# Patient Record
Sex: FEMALE | Race: WHITE | Employment: STUDENT | URBAN - METROPOLITAN AREA
[De-identification: names, ages, dates, MRNs, and addresses within clinical notes are randomized per-mention and may not be internally consistent; named-entity substitution may affect disease eponyms.]

---

## 2019-07-03 ENCOUNTER — HOSPITAL ENCOUNTER (EMERGENCY)
Facility: CLINIC | Age: 19
Discharge: HOME | End: 2019-07-04
Attending: EMERGENCY MEDICINE | Admitting: EMERGENCY MEDICINE
Payer: COMMERCIAL

## 2019-07-03 VITALS
HEIGHT: 65 IN | WEIGHT: 127 LBS | DIASTOLIC BLOOD PRESSURE: 88 MMHG | SYSTOLIC BLOOD PRESSURE: 121 MMHG | HEART RATE: 58 BPM | BODY MASS INDEX: 21.16 KG/M2 | TEMPERATURE: 98.4 F | RESPIRATION RATE: 20 BRPM | OXYGEN SATURATION: 98 %

## 2019-07-03 DIAGNOSIS — B27.90 INFECTIOUS MONONUCLEOSIS WITHOUT COMPLICATION, INFECTIOUS MONONUCLEOSIS DUE TO UNSPECIFIED ORGANISM: ICD-10-CM

## 2019-07-03 PROCEDURE — 99283 EMERGENCY DEPT VISIT LOW MDM: CPT

## 2019-07-03 ASSESSMENT — MIFFLIN-ST. JEOR: SCORE: 1351.95

## 2019-07-03 NOTE — ED AVS SNAPSHOT
Emergency Department  [address] [place] [address]  Phone:  [phone]  Fax:  [phone]                                    Jina De La Vega   MRN: [id number]    Department:   Emergency Department   Date of Visit:  7/3/2019           After Visit Summary Signature Page    I have received my discharge instructions, and my questions have been answered. I have discussed any challenges I see with this plan with the nurse or doctor.    ..........................................................................................................................................  Patient/Patient Representative Signature      ..........................................................................................................................................  Patient Representative Print Name and Relationship to Patient    ..................................................               ................................................  Date                                   Time    ..........................................................................................................................................  Reviewed by Signature/Title    ...................................................              ..............................................  Date                                               Time          22EPIC Rev 08/18

## 2019-07-04 PROCEDURE — 25000132 ZZH RX MED GY IP 250 OP 250 PS 637: Performed by: EMERGENCY MEDICINE

## 2019-07-04 RX ORDER — OXYCODONE HYDROCHLORIDE 5 MG/1
5 TABLET ORAL ONCE
Status: COMPLETED | OUTPATIENT
Start: 2019-07-04 | End: 2019-07-04

## 2019-07-04 RX ORDER — DIPHENHYDRAMINE HYDROCHLORIDE AND LIDOCAINE HYDROCHLORIDE AND ALUMINUM HYDROXIDE AND MAGNESIUM HYDRO
10 KIT ONCE
Status: COMPLETED | OUTPATIENT
Start: 2019-07-04 | End: 2019-07-04

## 2019-07-04 RX ADMIN — OXYCODONE HYDROCHLORIDE 5 MG: 5 TABLET ORAL at 01:27

## 2019-07-04 RX ADMIN — DIPHENHYDRAMINE HYDROCHLORIDE AND LIDOCAINE HYDROCHLORIDE AND ALUMINUM HYDROXIDE AND MAGNESIUM HYDRO 10 ML: KIT at 01:38

## 2019-07-04 ASSESSMENT — ENCOUNTER SYMPTOMS: SORE THROAT: 1

## 2019-07-04 NOTE — ED PROVIDER NOTES
"  History     Chief Complaint:  Pharyngitis    HPI   Jina De La Vega is a 18 year old female, who is currently ill with mononucleosis, who presents with to the ED for evaluation of pharyngitis. The patient states that she was diagnosed with mono on Monday and was alternating between Tylenol and Ibuprofen for the pain, but they did not help. She states that she finished her course of prednisone with no relief. The patient states she was started on Oxycodone and Dexamethasone earlier today, but her pain has not subsided, which prompted her to present to the ED. She states that she has been trying to drink fluids. The patient states that her last dose of 600 mg of Ibuprofen was at 1900, and she took 1000 mg of Tylenol and one dose of Oxycodone at 2200.    Allergies:  The patient has no known drug allergies.    Medications:    Prednisone  Oxycodone  Dexamethasone     Past Medical History:    Mononucleosis    Past Surgical History:    The patient does not have any pertinent past surgical history.    Family History:    No past pertinent family history.    Social History:  Negative for tobacco use.  Negative for alcohol use.  Negative for drug use.  Marital Status:  Single [1]     Review of Systems   Constitutional: Negative for fever.   HENT: Positive for sore throat.         Jaw pain   Respiratory: Negative for cough and shortness of breath.    Cardiovascular: Negative for chest pain.   Gastrointestinal: Negative for abdominal pain.   All other systems reviewed and are negative.        Physical Exam     Patient Vitals for the past 24 hrs:   BP Temp Temp src Pulse Heart Rate Resp SpO2 Height Weight   07/03/19 2346 121/88 98.4  F (36.9  C) Oral 58 58 20 98 % 1.651 m (5' 5\") 57.6 kg (127 lb)     Physical Exam  General: Appears well-developed and well-nourished.   Head: No signs of trauma.   Mouth/Throat: Oropharynx with erythema and swelling, but uvula midline and airway patent.  Eyes: Conjunctivae are normal.   Neck: " Normal range of motion. No nuchal rigidity. + cervical adenopathy  CV: Normal rate and regular rhythm.    Resp: Effort normal and breath sounds normal. No respiratory distress.   MSK: Normal range of motion. no edema. No Calf tenderness.  Neuro: The patient is alert and oriented.  Speech normal.  GCS 15  Skin: Skin is warm and dry. No rash noted.   Psych: normal mood and affect. behavior is normal.       Emergency Department Course   Interventions:  0127 Roxicodone 5 mg PO  0138 Magic mouthwash 10 mLs swish and swallow    Emergency Department Course:  Nursing notes and vitals reviewed. (0049) I performed an exam of the patient as documented above.      0227 I rechecked the patient and discussed the results of her workup thus far.     Findings and plan explained to the Patient. Patient discharged home with instructions regarding supportive care, medications, and reasons to return. The importance of close follow-up was reviewed.  Impression & Plan    Medical Decision Making:  Jina De La Vega is a 18 year old woman who presents due to sore throat.  She was diagnosed with mono and her doctor had started her on prednisone and oxycodone, but given the continued pain she presented to the ER.  On my evaluation, she did have signs of pharyngitis, but her airway was patent and she was able to speak without difficulty.  She reports that she has been able to eat and drink.  I did discuss ways to optimize her pain control including taking extra dose of the oxycodone.  She is also given Magic mouthwash in the ED with temporary improvement.  Explained that unfortunately I am unable to fully take away the patient's pain.  She is recommended continue with her regimen with adjustments that were discussed and to follow-up with her primary care doctor return to the ER for any further concerns.    Diagnosis:    ICD-10-CM   1. Infectious mononucleosis without complication, infectious mononucleosis due to unspecified organism B27.90        Disposition:  The patient was discharged to home.    Scribe Disclosure:  I, Brandi Coleman, am serving as a scribe on 7/4/2019 at 12:49 AM to personally document services performed by Chauncey Diaz MD based on my observations and the provider's statements to me.     Brandi Coleman  7/3/2019    EMERGENCY DEPARTMENT    Scribe Disclosure:  I, Waldemar Ruy, am serving as a scribe at 1:19 AM on 7/4/2019 to document services personally performed by Chauncey Diaz MD based on my observations and the provider's statements to me.      Chauncey Diaz MD  07/09/19 0419

## 2019-07-04 NOTE — ED TRIAGE NOTES
Pt reports diagnosed with mono on Monday, was alternating between tylenol/ibuprofen and pain was not managed. Pt also finished course of prednisone w/o relief. Pt states she was also started on oxy and dexamethasone today and still having terrible pain. Pt states she is still able to eat/drink.

## 2019-07-05 ENCOUNTER — HOSPITAL ENCOUNTER (INPATIENT)
Facility: CLINIC | Age: 19
LOS: 2 days | Discharge: HOME | DRG: 866 | End: 2019-07-09
Attending: EMERGENCY MEDICINE | Admitting: INTERNAL MEDICINE
Payer: COMMERCIAL

## 2019-07-05 DIAGNOSIS — B27.99 INFECTIOUS MONONUCLEOSIS WITH HEPATITIS: ICD-10-CM

## 2019-07-05 DIAGNOSIS — B27.90 ACUTE TONSILLITIS DUE TO INFECTIOUS MONONUCLEOSIS: Primary | ICD-10-CM

## 2019-07-05 DIAGNOSIS — E86.0 DEHYDRATION: ICD-10-CM

## 2019-07-05 DIAGNOSIS — J03.80 ACUTE TONSILLITIS DUE TO INFECTIOUS MONONUCLEOSIS: Primary | ICD-10-CM

## 2019-07-05 DIAGNOSIS — R11.2 NAUSEA AND VOMITING, INTRACTABILITY OF VOMITING NOT SPECIFIED, UNSPECIFIED VOMITING TYPE: ICD-10-CM

## 2019-07-05 DIAGNOSIS — B27.90 ACUTE PHARYNGITIS DUE TO INFECTIOUS MONONUCLEOSIS: ICD-10-CM

## 2019-07-05 DIAGNOSIS — B17.8 INFECTIOUS MONONUCLEOSIS WITH HEPATITIS: ICD-10-CM

## 2019-07-05 LAB
ALBUMIN SERPL-MCNC: 3.3 G/DL (ref 3.4–5)
ALP SERPL-CCNC: 187 U/L (ref 40–150)
ALT SERPL W P-5'-P-CCNC: 351 U/L (ref 0–50)
ANION GAP SERPL CALCULATED.3IONS-SCNC: 6 MMOL/L (ref 3–14)
AST SERPL W P-5'-P-CCNC: 103 U/L (ref 0–35)
BASOPHILS # BLD AUTO: 0 10E9/L (ref 0–0.2)
BASOPHILS NFR BLD AUTO: 0 %
BILIRUB SERPL-MCNC: 0.7 MG/DL (ref 0.2–1.3)
BUN SERPL-MCNC: 6 MG/DL (ref 7–30)
CALCIUM SERPL-MCNC: 8.6 MG/DL (ref 8.5–10.1)
CHLORIDE SERPL-SCNC: 103 MMOL/L (ref 96–110)
CO2 SERPL-SCNC: 29 MMOL/L (ref 20–32)
CREAT SERPL-MCNC: 0.78 MG/DL (ref 0.5–1)
DIFFERENTIAL METHOD BLD: ABNORMAL
EOSINOPHIL # BLD AUTO: 0 10E9/L (ref 0–0.7)
EOSINOPHIL NFR BLD AUTO: 0 %
ERYTHROCYTE [DISTWIDTH] IN BLOOD BY AUTOMATED COUNT: 15.9 % (ref 10–15)
GFR SERPL CREATININE-BSD FRML MDRD: >90 ML/MIN/{1.73_M2}
GLUCOSE SERPL-MCNC: 109 MG/DL (ref 70–99)
HCT VFR BLD AUTO: 41 % (ref 35–47)
HGB BLD-MCNC: 13.6 G/DL (ref 11.7–15.7)
LACTATE BLD-SCNC: 0.8 MMOL/L (ref 0.7–2)
LYMPHOCYTES # BLD AUTO: 12.7 10E9/L (ref 0.8–5.3)
LYMPHOCYTES NFR BLD AUTO: 53 %
MCH RBC QN AUTO: 29.1 PG (ref 26.5–33)
MCHC RBC AUTO-ENTMCNC: 33.2 G/DL (ref 31.5–36.5)
MCV RBC AUTO: 88 FL (ref 78–100)
MONOCYTES # BLD AUTO: 1 10E9/L (ref 0–1.3)
MONOCYTES NFR BLD AUTO: 4 %
NEUTROPHILS # BLD AUTO: 10.3 10E9/L (ref 1.6–8.3)
NEUTROPHILS NFR BLD AUTO: 43 %
PLATELET # BLD AUTO: 224 10E9/L (ref 150–450)
PLATELET # BLD EST: ABNORMAL 10*3/UL
POTASSIUM SERPL-SCNC: 3.9 MMOL/L (ref 3.4–5.3)
PROT SERPL-MCNC: 7.1 G/DL (ref 6.8–8.8)
RBC # BLD AUTO: 4.67 10E12/L (ref 3.8–5.2)
RBC MORPH BLD: NORMAL
SODIUM SERPL-SCNC: 138 MMOL/L (ref 133–144)
VARIANT LYMPHS BLD QL SMEAR: PRESENT
WBC # BLD AUTO: 24 10E9/L (ref 4–11)

## 2019-07-05 PROCEDURE — G0378 HOSPITAL OBSERVATION PER HR: HCPCS

## 2019-07-05 PROCEDURE — 36415 COLL VENOUS BLD VENIPUNCTURE: CPT | Performed by: INTERNAL MEDICINE

## 2019-07-05 PROCEDURE — 99285 EMERGENCY DEPT VISIT HI MDM: CPT | Mod: 25

## 2019-07-05 PROCEDURE — 25000132 ZZH RX MED GY IP 250 OP 250 PS 637: Performed by: INTERNAL MEDICINE

## 2019-07-05 PROCEDURE — 25000131 ZZH RX MED GY IP 250 OP 636 PS 637: Performed by: INTERNAL MEDICINE

## 2019-07-05 PROCEDURE — 96375 TX/PRO/DX INJ NEW DRUG ADDON: CPT

## 2019-07-05 PROCEDURE — 25800030 ZZH RX IP 258 OP 636: Performed by: INTERNAL MEDICINE

## 2019-07-05 PROCEDURE — 96376 TX/PRO/DX INJ SAME DRUG ADON: CPT

## 2019-07-05 PROCEDURE — 99207 ZZC CDG-HISTORY COMP: MEETS EXP. PROBLEM FOCUSED-DOWN CODED LACK OF ROS: CPT | Performed by: INTERNAL MEDICINE

## 2019-07-05 PROCEDURE — 96374 THER/PROPH/DIAG INJ IV PUSH: CPT

## 2019-07-05 PROCEDURE — 85025 COMPLETE CBC W/AUTO DIFF WBC: CPT | Performed by: EMERGENCY MEDICINE

## 2019-07-05 PROCEDURE — 99207 ZZC NON-BILLABLE SERV PER CHARTING: CPT | Performed by: PHYSICIAN ASSISTANT

## 2019-07-05 PROCEDURE — 83605 ASSAY OF LACTIC ACID: CPT | Performed by: INTERNAL MEDICINE

## 2019-07-05 PROCEDURE — 96361 HYDRATE IV INFUSION ADD-ON: CPT

## 2019-07-05 PROCEDURE — 25000128 H RX IP 250 OP 636: Performed by: EMERGENCY MEDICINE

## 2019-07-05 PROCEDURE — 99218 ZZC INITIAL OBSERVATION CARE,LEVL I: CPT | Performed by: INTERNAL MEDICINE

## 2019-07-05 PROCEDURE — 80053 COMPREHEN METABOLIC PANEL: CPT | Performed by: EMERGENCY MEDICINE

## 2019-07-05 RX ORDER — LIDOCAINE HYDROCHLORIDE 20 MG/ML
5 SOLUTION OROPHARYNGEAL
Status: DISCONTINUED | OUTPATIENT
Start: 2019-07-05 | End: 2019-07-06

## 2019-07-05 RX ORDER — DEXAMETHASONE SODIUM PHOSPHATE 10 MG/ML
6 INJECTION, SOLUTION INTRAMUSCULAR; INTRAVENOUS ONCE
Status: COMPLETED | OUTPATIENT
Start: 2019-07-05 | End: 2019-07-05

## 2019-07-05 RX ORDER — ONDANSETRON 4 MG/1
4 TABLET, ORALLY DISINTEGRATING ORAL EVERY 6 HOURS PRN
COMMUNITY

## 2019-07-05 RX ORDER — ONDANSETRON 2 MG/ML
4 INJECTION INTRAMUSCULAR; INTRAVENOUS EVERY 30 MIN PRN
Status: DISCONTINUED | OUTPATIENT
Start: 2019-07-05 | End: 2019-07-05

## 2019-07-05 RX ORDER — SODIUM CHLORIDE 9 MG/ML
INJECTION, SOLUTION INTRAVENOUS CONTINUOUS
Status: DISCONTINUED | OUTPATIENT
Start: 2019-07-05 | End: 2019-07-05

## 2019-07-05 RX ORDER — SPIRONOLACTONE 25 MG/1
25 TABLET ORAL DAILY
COMMUNITY
End: 2019-07-05

## 2019-07-05 RX ORDER — ACETAMINOPHEN 325 MG/1
650 TABLET ORAL EVERY 4 HOURS PRN
Status: DISCONTINUED | OUTPATIENT
Start: 2019-07-05 | End: 2019-07-09 | Stop reason: HOSPADM

## 2019-07-05 RX ORDER — PREDNISONE 20 MG/1
20 TABLET ORAL 2 TIMES DAILY
Status: ON HOLD | COMMUNITY
End: 2019-07-09

## 2019-07-05 RX ORDER — SPIRONOLACTONE 50 MG/1
50 TABLET, FILM COATED ORAL DAILY
COMMUNITY

## 2019-07-05 RX ORDER — SODIUM CHLORIDE, SODIUM LACTATE, POTASSIUM CHLORIDE, CALCIUM CHLORIDE 600; 310; 30; 20 MG/100ML; MG/100ML; MG/100ML; MG/100ML
INJECTION, SOLUTION INTRAVENOUS CONTINUOUS
Status: DISCONTINUED | OUTPATIENT
Start: 2019-07-05 | End: 2019-07-09

## 2019-07-05 RX ORDER — KETOROLAC TROMETHAMINE 15 MG/ML
10 INJECTION, SOLUTION INTRAMUSCULAR; INTRAVENOUS ONCE
Status: COMPLETED | OUTPATIENT
Start: 2019-07-05 | End: 2019-07-05

## 2019-07-05 RX ORDER — AMOXICILLIN 250 MG
2 CAPSULE ORAL 2 TIMES DAILY PRN
Status: DISCONTINUED | OUTPATIENT
Start: 2019-07-05 | End: 2019-07-09 | Stop reason: HOSPADM

## 2019-07-05 RX ORDER — AMOXICILLIN 250 MG
1 CAPSULE ORAL 2 TIMES DAILY
Status: DISCONTINUED | OUTPATIENT
Start: 2019-07-05 | End: 2019-07-09 | Stop reason: HOSPADM

## 2019-07-05 RX ORDER — ONDANSETRON 2 MG/ML
4 INJECTION INTRAMUSCULAR; INTRAVENOUS EVERY 6 HOURS PRN
Status: DISCONTINUED | OUTPATIENT
Start: 2019-07-05 | End: 2019-07-09 | Stop reason: HOSPADM

## 2019-07-05 RX ORDER — ESCITALOPRAM OXALATE 10 MG/1
10 TABLET ORAL DAILY
COMMUNITY

## 2019-07-05 RX ORDER — ONDANSETRON 4 MG/1
4 TABLET, ORALLY DISINTEGRATING ORAL EVERY 6 HOURS PRN
Status: DISCONTINUED | OUTPATIENT
Start: 2019-07-05 | End: 2019-07-09 | Stop reason: HOSPADM

## 2019-07-05 RX ORDER — PROCHLORPERAZINE 25 MG
25 SUPPOSITORY, RECTAL RECTAL EVERY 12 HOURS PRN
Status: DISCONTINUED | OUTPATIENT
Start: 2019-07-05 | End: 2019-07-09 | Stop reason: HOSPADM

## 2019-07-05 RX ORDER — AMOXICILLIN 250 MG
2 CAPSULE ORAL 2 TIMES DAILY
Status: DISCONTINUED | OUTPATIENT
Start: 2019-07-05 | End: 2019-07-09 | Stop reason: HOSPADM

## 2019-07-05 RX ORDER — AMOXICILLIN 250 MG
1 CAPSULE ORAL 2 TIMES DAILY PRN
Status: DISCONTINUED | OUTPATIENT
Start: 2019-07-05 | End: 2019-07-09 | Stop reason: HOSPADM

## 2019-07-05 RX ORDER — NALOXONE HYDROCHLORIDE 0.4 MG/ML
.1-.4 INJECTION, SOLUTION INTRAMUSCULAR; INTRAVENOUS; SUBCUTANEOUS
Status: DISCONTINUED | OUTPATIENT
Start: 2019-07-05 | End: 2019-07-09 | Stop reason: HOSPADM

## 2019-07-05 RX ORDER — ACETAMINOPHEN 650 MG/1
650 SUPPOSITORY RECTAL EVERY 4 HOURS PRN
Status: DISCONTINUED | OUTPATIENT
Start: 2019-07-05 | End: 2019-07-09 | Stop reason: HOSPADM

## 2019-07-05 RX ORDER — IBUPROFEN 100 MG/5ML
600 SUSPENSION, ORAL (FINAL DOSE FORM) ORAL EVERY 6 HOURS PRN
Status: DISCONTINUED | OUTPATIENT
Start: 2019-07-05 | End: 2019-07-09 | Stop reason: HOSPADM

## 2019-07-05 RX ORDER — OXYCODONE HCL 5 MG/5 ML
5-10 SOLUTION, ORAL ORAL EVERY 4 HOURS PRN
Status: DISCONTINUED | OUTPATIENT
Start: 2019-07-05 | End: 2019-07-06

## 2019-07-05 RX ORDER — PROCHLORPERAZINE MALEATE 10 MG
10 TABLET ORAL EVERY 6 HOURS PRN
Status: DISCONTINUED | OUTPATIENT
Start: 2019-07-05 | End: 2019-07-09 | Stop reason: HOSPADM

## 2019-07-05 RX ORDER — MORPHINE SULFATE 4 MG/ML
4 INJECTION, SOLUTION INTRAMUSCULAR; INTRAVENOUS
Status: COMPLETED | OUTPATIENT
Start: 2019-07-05 | End: 2019-07-05

## 2019-07-05 RX ORDER — DEXAMETHASONE 4 MG/1
12 TABLET ORAL ONCE
Status: ON HOLD | COMMUNITY
End: 2019-07-09

## 2019-07-05 RX ORDER — ESCITALOPRAM OXALATE 20 MG/1
20 TABLET ORAL DAILY
COMMUNITY
End: 2019-07-05

## 2019-07-05 RX ADMIN — ACETAMINOPHEN 650 MG: 325 TABLET, FILM COATED ORAL at 09:33

## 2019-07-05 RX ADMIN — SENNOSIDES AND DOCUSATE SODIUM 1 TABLET: 8.6; 5 TABLET ORAL at 20:12

## 2019-07-05 RX ADMIN — SODIUM CHLORIDE, POTASSIUM CHLORIDE, SODIUM LACTATE AND CALCIUM CHLORIDE: 600; 310; 30; 20 INJECTION, SOLUTION INTRAVENOUS at 17:10

## 2019-07-05 RX ADMIN — OXYCODONE HYDROCHLORIDE 10 MG: 5 SOLUTION ORAL at 09:26

## 2019-07-05 RX ADMIN — DEXAMETHASONE SODIUM PHOSPHATE 6 MG: 10 INJECTION, SOLUTION INTRAMUSCULAR; INTRAVENOUS at 06:56

## 2019-07-05 RX ADMIN — IBUPROFEN 600 MG: 200 SUSPENSION ORAL at 11:56

## 2019-07-05 RX ADMIN — SODIUM CHLORIDE, POTASSIUM CHLORIDE, SODIUM LACTATE AND CALCIUM CHLORIDE: 600; 310; 30; 20 INJECTION, SOLUTION INTRAVENOUS at 08:05

## 2019-07-05 RX ADMIN — MORPHINE SULFATE 4 MG: 4 INJECTION INTRAVENOUS at 05:55

## 2019-07-05 RX ADMIN — IBUPROFEN 600 MG: 200 SUSPENSION ORAL at 17:56

## 2019-07-05 RX ADMIN — IBUPROFEN 600 MG: 200 SUSPENSION ORAL at 23:55

## 2019-07-05 RX ADMIN — OXYCODONE HYDROCHLORIDE 10 MG: 5 SOLUTION ORAL at 22:09

## 2019-07-05 RX ADMIN — ACETAMINOPHEN 650 MG: 325 TABLET, FILM COATED ORAL at 23:56

## 2019-07-05 RX ADMIN — KETOROLAC TROMETHAMINE 10 MG: 15 INJECTION, SOLUTION INTRAMUSCULAR; INTRAVENOUS at 05:26

## 2019-07-05 RX ADMIN — DEXAMETHASONE 6 MG: 2 TABLET ORAL at 17:56

## 2019-07-05 RX ADMIN — SODIUM CHLORIDE 1000 ML: 9 INJECTION, SOLUTION INTRAVENOUS at 05:26

## 2019-07-05 RX ADMIN — ACETAMINOPHEN 650 MG: 325 TABLET, FILM COATED ORAL at 14:03

## 2019-07-05 RX ADMIN — MORPHINE SULFATE 4 MG: 4 INJECTION INTRAVENOUS at 07:02

## 2019-07-05 RX ADMIN — OXYCODONE HYDROCHLORIDE 10 MG: 5 SOLUTION ORAL at 14:03

## 2019-07-05 RX ADMIN — OXYCODONE HYDROCHLORIDE 10 MG: 5 SOLUTION ORAL at 17:55

## 2019-07-05 RX ADMIN — SODIUM CHLORIDE 1000 ML: 9 INJECTION, SOLUTION INTRAVENOUS at 06:21

## 2019-07-05 RX ADMIN — ONDANSETRON 4 MG: 2 INJECTION INTRAMUSCULAR; INTRAVENOUS at 05:26

## 2019-07-05 RX ADMIN — MORPHINE SULFATE 4 MG: 4 INJECTION INTRAVENOUS at 05:26

## 2019-07-05 RX ADMIN — ACETAMINOPHEN 650 MG: 325 TABLET, FILM COATED ORAL at 20:12

## 2019-07-05 ASSESSMENT — ENCOUNTER SYMPTOMS
VOMITING: 1
SORE THROAT: 1
ABDOMINAL PAIN: 0
FEVER: 0

## 2019-07-05 ASSESSMENT — MIFFLIN-ST. JEOR: SCORE: 1351.95

## 2019-07-05 NOTE — ED NOTES
"[facility]  ED Nurse Handoff Report    ED Chief complaint: Nausea & Vomiting      ED Diagnosis:   Final diagnoses:   Infectious mononucleosis with hepatitis   Nausea and vomiting, intractability of vomiting not specified, unspecified vomiting type   Dehydration   Acute pharyngitis due to infectious mononucleosis       Code Status: Full Code    Allergies: No Known Allergies    Activity level - Baseline/Home:  Independent  Activity Level - Current:   Independent    Patient's Preferred language: eng   Needed?: No    Isolation: No  Infection: Not Applicable  Bariatric?: No    Vital Signs:   Vitals:    07/05/19 0550 07/05/19 0600 07/05/19 0610 07/05/19 0620   BP:   112/68 104/67   Pulse:   105    Temp:       TempSrc:       SpO2: 100% 100% 99% 100%   Weight:       Height:           Cardiac Rhythm: ,        Pain level: 0-10 Pain Scale: 9    Is this patient confused?: No   Does this patient have a guardian?  No         If yes, is there guardianship documents in the Epic \"Code/ACP\" activity?  No         Guardian Notified?  N/A  [place] - Suicide Severity Rating Scale Completed?  Yes  If yes, what color did the patient score?  White    Patient Report: Initial Complaint: NV throat pain  Focused Assessment: same  Tests Performed: lab  Abnormal Results:   Labs Ordered and Resulted from Time of ED Arrival Up to the Time of Departure from the ED   CBC WITH PLATELETS DIFFERENTIAL - Abnormal; Notable for the following components:       Result Value    WBC 24.0 (*)     RDW 15.9 (*)     Absolute Neutrophil 10.3 (*)     Absolute Lymphocytes 12.7 (*)     All other components within normal limits   COMPREHENSIVE METABOLIC PANEL - Abnormal; Notable for the following components:    Glucose 109 (*)     Urea Nitrogen 6 (*)     Albumin 3.3 (*)     Alkaline Phosphatase 187 (*)      (*)      (*)     All other components within normal limits   VITAL SIGNS   PULSE OXIMETRY NURSING   PERIPHERAL IV " CATHETER       Treatments provided: below    Family Comments: na    OBS brochure/video discussed/provided to patient/family: Yes              Name of person given brochure if not patient: n              Relationship to patient: n    ED Medications:   Medications   0.9% sodium chloride BOLUS (0 mLs Intravenous Stopped 7/5/19 0621)     Followed by   0.9% sodium chloride BOLUS (1,000 mLs Intravenous New Bag 7/5/19 0621)     Followed by   sodium chloride 0.9% infusion (has no administration in time range)   morphine (PF) injection 4 mg (4 mg Intravenous Given 7/5/19 0555)   ondansetron (ZOFRAN) injection 4 mg (4 mg Intravenous Given 7/5/19 0526)   ketorolac (TORADOL) injection 10 mg (10 mg Intravenous Given 7/5/19 0526)       Drips infusing?:  Yes    For the majority of the shift this patient was Green.   Interventions performed were n.    Severe Sepsis OR Septic Shock Diagnosis Present: No    To be done/followed up on inpatient unit:  na    ED NURSE PHONE NUMBER: [phone]

## 2019-07-05 NOTE — PHARMACY-ADMISSION MEDICATION HISTORY
Admission medication history interview status for the 7/5/2019  admission is complete. See EPIC admission navigator for prior to admission medications     Medication history source reliability:Moderate    Actions taken by pharmacist (provider contacted, etc):interviewed pt twice. Called guevara to confirm list and doses.  Pt is having difficulty speaking due to throat issues.     Additional medication history information not noted on PTA med list :guevara is getting an oxycodone 5-10 mg po q4h prn pain rx ready for her but she has not picked it up yet.     Medication reconciliation/reorder completed by provider prior to medication history? No    Time spent in this activity: 20 minutes    Prior to Admission medications    Medication Sig Last Dose Taking? Auth Provider   dexamethasone (DECADRON) 4 MG tablet Take 12 mg by mouth once 7/3/2019 Yes Unknown, Entered By History   escitalopram (LEXAPRO) 10 MG tablet Take 10 mg by mouth daily Past Week at Unknown time Yes Unknown, Entered By History   ondansetron (ZOFRAN-ODT) 4 MG ODT tab Take 4 mg by mouth every 6 hours as needed for nausea Past Week at Unknown time Yes Unknown, Entered By History   predniSONE (DELTASONE) 20 MG tablet Take 20 mg by mouth 2 times daily For 4 days, picked up 7/1/19 Past Week at Unknown time Yes Unknown, Entered By History   spironolactone (ALDACTONE) 50 MG tablet Take 50 mg by mouth daily Past Week at Unknown time Yes Unknown, Entered By History

## 2019-07-05 NOTE — PLAN OF CARE
RECEIVING UNIT ED HANDOFF REVIEW    ED Nurse Handoff Report was reviewed by: Blanche Jarvis on July 5, 2019 at 6:47 AM

## 2019-07-05 NOTE — PROGRESS NOTES
Pt a&o, up indep in room, mom at bedside.  Pt has iv fluids infusing.  Pt c/o pain, hurts to swallow water or pills.  Pt was able to rest for a few hours after pain medication given.  Pt has also been able to eat a few bites of food.  Pt fever 102, prn tylenol given as well as ice and heat.  Will continue to monitor and POC

## 2019-07-05 NOTE — H&P
Admitted:     07/05/2019      PRIMARY CARE PHYSICIAN:  Oralia Mcintosh MD.      CHIEF COMPLAINT:  Sore throat, nausea, vomiting, recent diagnosis of mono.      HISTORY OF PRESENT ILLNESS:  Jina De La Vega is a pleasant 18-year-old  female who has been sick for the last week.  The patient started having symptoms of sore throat and feeling ill on Friday.  She felt worse on Monday and went into her clinic was diagnosed with mononucleosis.  The patient's symptoms of pain and difficulty swallowing got worse and she came into our emergency department Wednesday.  The patient was treated with some steroids.  She was discharged on oxycodone and the patient felt okay when discharged.  However, over the last 2 days, the patient has had extreme pain in her throat and jaw and difficulty swallowing.  She is unable to keep anything down.  She has no fever or abdominal pain.  She was taking prednisone and not taking dexamethasone for the last 2 days.  The patient has trouble with some nausea and has been vomiting some of her medications, unable to keep anything down.  The patient came into [facility] for further assessment.      In the emergency room, the patient was seen by Dr. Zully Osei.  The patient's temperature of 100 degrees.  She was tachycardic in the low 100s, blood pressures were okay.  Oxygen saturations were okay.  The patient was mentating reasonably well, although she is quite weak.  Blood work revealed normal electrolytes, normal kidney function.  LFTs are elevated with ALT of 351, AST of 103, alkaline phosphatase is 187.  Albumin is 3.3, calcium is 8.6.  White count is 24,000 with a left shift with absolute neutrophil count of 10,300, absolute lymphocytic count of 12,700.  Glucose is 109.  In the Emergency Department, the patient received 6 mg of Decadron.  She was given Toradol as well as a liter of normal saline.  The patient also received some morphine as well for pain control.  She  is being admitted under observation status.      PAST MEDICAL HISTORY:   1.  Ankle sprain.   2.  Right hip pain.    3.  Lumbar spine instability.   4.  Chronic back pain.      PAST SURGICAL HISTORY:  None.      FAMILY HISTORY:  Seasonal allergies.      SOCIAL HISTORY:  The patient lost her father about a year and half ago who was involved with [employer].  No alcohol or recreational drug use, no tobacco.  She is single, lives with her mother.      ALLERGIES:  NO KNOWN DRUG ALLERGIES.      CURRENT MEDICATIONS:  Decadron, oxycodone.      REVIEW OF SYSTEMS:  Ten-point systems reviewed and as dictated in the history of present illness.      PHYSICAL EXAMINATION:   VITAL SIGNS:  Temperature 100, heart rate 105, respirations 20, blood pressure 104/67, sats 100% on room air.   GENERAL:  The patient is a 18-year-old medium-built  female.  She appears to be weak, but is appropriate, alert.   HEENT:  Pupils are equal.  Sclerae are anicteric.  Oropharynx is dry.   NECK:  Veins are not elevated.  She does have some neck fullness and discomfort and adenopathy in the cervical lymph node.  There is no nuchal rigidity.   PULMONARY:  Lungs are clear to auscultation, although the breath sounds at the bases are diminished on the right side.   CARDIOVASCULAR:  S1, S2, tachycardic, regular.   ABDOMEN:  Hypoactive.  She has no tenderness.  There is no guarding or rebound in the spleen or the liver.   MUSCULOSKELETAL:  No edema.   SKIN:  Warm, somewhat warm to touch.  No rash.   NEUROLOGIC:  She is grossly nonfocal with cranial nerves grossly intact.     DERM:  The skin is warm, dry, well perfused.   PSYCHIATRIC:  Mood and affect, stable.      LABORTORY STUDIES:  As dictated in the history of present illness.      ASSESSMENT:  Jina De La Vega is a 18-year-old with a diagnosis of infectious mononucleosis, now for the last 5 days, admitted a couple days ago with ongoing severe pain and anorexia, is readmitted now due to ongoing  recurrent nausea and vomiting.  The patient unable to keep anything down, being admitted under observation status.      PLAN:   1.  Infectious mononucleosis.  The patient was getting prednisone and Decadron at home.  The patient has been dehydrated, unable to keep anything down, has severe pain.  The patient will be admitted under observation status for the next 24 hours.  I am going to give her lactated Ringer's oral liquid and oxycodone for pain control.  We are going to 10 mg instead of 5 and use ibuprofen and Tylenol as well to help mix her medications.  The patient also received Zofran and Compazine to help with her nausea.   2.  Elevation of transaminases.  The patient's transaminases are elevated as a result of her mononucleosis.  We will monitor her LFTs.  We will put her on a modified diet of full liquids to allow ease of eating.   3.  Deep venous thrombosis prophylaxis:  The patient will receive compression boots.      CODE STATUS:  Full.         KITTY FIGUEROA MD             D: 2019   T: 2019   MT: ADITHYA      Name:     JEWELL LANDRY   MRN:      [id number]        Account:      [id number]   :      2000        Admitted:     2019                   Document: [id number]       cc: Oralia Mcintosh MD

## 2019-07-05 NOTE — ED PROVIDER NOTES
History     Chief Complaint:  Sore Throat and Vomitting    HPI   Jina De La Vega is a 18 year old female who presents to the ED for the evaluation of a sore throat and vomiting. The patient has had the above symptoms for a couple of days. On Monday, the patient was diagnosed with Mono. She endorses extreme pain in her throat and jaw, difficulty swallowing, and that she cannot keep anything down. She denies fever or abdominal pain. The patient was taking prednisone but stopped and took a dose of 12 mg of dexamethasone 2 days ago.  Of note, the patient last took a combination of ibuprofen, Tylenol, and OxyContin at 0130 for pain management that she threw up right away.  She hasn't been able to keep anything down recently.  She does note that she was able to keep down her medications previously.  She reports that she was diagnosed with mono by blood test and her pediatrician's office.  She also had a strep test on that was negative.    Allergies:  No Known Drug Allergies    Medications:    Ibuprofen  Tylenol  Oxycontin    Past Medical History:    Ankle sprain  Right hip pain  Lumbar spine instability  Backache    Past Surgical History:    Surgical history reviewed. No pertinent surgical history.    Family History:    Allergies    Social History:  The patient was accompanied to the ED by mother.  She is home from college for the summer.  Her father Zak Ross passed away approximately 16 months ago.  Smoking Status: Never Smoker  Smokeless Tobacco: Never Used  Alcohol Use: Negative  Drug Use: Negative  PCP: Oralia Mcintosh  Marital Status:  Single     Review of Systems   Constitutional: Negative for fever.   HENT: Positive for sore throat.         Difficulty swallowing  Jaw pain   Gastrointestinal: Positive for vomiting. Negative for abdominal pain.   All other systems reviewed and are negative.    Physical Exam   First Vitals:  Patient Vitals for the past 24 hrs:   BP Temp Temp src Heart Rate SpO2  Patient Education     Earache, No Infection (Adult)  Earaches can happen without an infection. This occurs when air and fluid build up behind the eardrum causing a feeling of fullness and discomfort and reduced hearing. This is called otitis media with effusion (OME) or serous otitis media. It means there is fluid in the middle ear. It is not the same as acute otitis media, which is typically from infection. OME can happen when you have a cold if congestion blocks the passage that drains the middle ear. This passage is called the eustachian tube. OME may also occur with nasal allergies or after a bacterial middle ear infection. The pain or discomfort may come and go. You may hear clicking or popping sounds when you chew or swallow. You may feel that your balance is off. Or you may hear ringing in the ear. It often takes from several weeks up to 3 months for the fluid to clear on its own. Oral pain relievers and ear drops help if there is pain. Decongestants and antihistamines sometimes help. Antibiotics don't help since there is no infection. Your doctor may prescribe a nasal spray to help reduce swelling in the nose and eustachian tube. This can allow the ear to drain. If your OME doesn't improve after 3 months, surgery may be used to drain the fluid and insert a small tube in the eardrum to allow continued drainage. Because the middle ear fluid can become infected, it is important to watch for signs of an ear infection which may develop later. These signs include increased ear pain, fever, or drainage from the ear. Home care  The following guidelines will help you care for yourself at home:  Â· You may use over-the-counter medicine as directed to control pain, unless another medicine was prescribed. If you have chronic liver or kidney disease or ever had a stomach ulcer or GI bleeding, talk with your doctor before using these medicines.  Aspirin should never be used in anyone under 25years of age who is "Height Weight   07/05/19 0455 121/69 100  F (37.8  C) Oral 111 99 % 1.651 m (5' 5\") 57.6 kg (127 lb)     Physical Exam  General: Well-nourished, appears to be in severe pain when I enter the room, tearful  Eyes: PERRL, conjunctivae pink no scleral icterus or conjunctival injection  ENT:  Dry mucus membranes, foul-smelling breath, posterior oropharynx erythematous with mild edema no apparent exudates, uvula midline, no stridor or trismus, neck supple.  Doesn't want to talk due to pain but normal voice with phonation, tender anterior cervical lymphadenopathy  Respiratory:  Lungs clear to auscultation bilaterally, no crackles/rubs/wheezes.  Good air movement  CV: Normal rate and rhythm, no murmurs/rubs/gallops  GI:  Abdomen soft and non-distended.  Normoactive BS.  No tenderness, guarding or rebound  Skin: Warm, dry.  No rashes or petechiae  Musculoskeletal: No peripheral edema or calf tenderness  Neuro: Alert and oriented to person/place/time  Psychiatric: Normal affect    Emergency Department Course     Laboratory:  Laboratory findings were communicated with the patient who voiced understanding of the findings.    CBC: WBC 24.0 (H), HGB 13.6,   CMP: glucose 109 (H), BUN 6 (L), albumin 3.3 (L), ALKPHOS 187 (H),  (H),  (H), o/w WNL (Creatinine 0.78)    Interventions:  0526 NS 1000 mL IV  0526 Toradol 10 mg IV  0526 Morphine 4 mg IV  0526 Zofran 4 mg IV    Emergency Department Course:    0442 Nursing notes and vitals reviewed.    0450 I performed an exam of the patient as documented above.     0522 IV was inserted and blood was drawn for laboratory testing, results above.    0617 I spoke with Dr. Fajardo of the hospitalist service from [facility] regarding patient's presentation, findings, and plan of care.    Prior to admission, I personally reviewed the laboratory results with the patient and answered all related questions. Patient admitted to the observation unit under the care of Dr." ill with a fever. It may cause severe liver damage. Â· You may use over-the-counter decongestants such as phenylephrine or pseudoephedrine. But they are not always helpful. Don't use nasal spray decongestants more than 3 days. Longer use can make congestion worse. Prescription nasal sprays from your doctor don't typically have those restrictions. Â· Antihistamines may help if you are also having allergy symptoms. Â· You may use medicines such as guaifenesin to thin mucus and promote drainage. Follow-up care  Follow up with your healthcare provider or as advised if you are not feeling better after 3 days. When to seek medical advice  Call your healthcare provider right away if any of the following occur:  Â· Your ear pain gets worse or does not start to improveÂ   Â· Fever of 100.4Â°F (38Â°C) or higher, or as directed by your healthcare provider  Â· Fluid or blood draining from the ear  Â· Headache or sinus pain  Â· Stiff neck  Â· Unusual drowsiness or confusion  Date Last Reviewed: 10/1/2016  Â© 9782-3898 [employer]. [address] [place] [address]. All rights reserved. This information is not intended as a substitute for professional medical care. Always follow your healthcare professional's instructions. Patient Education     Middle Ear Infection (Adult)  You have an infection of the middle ear, the space behind the eardrum. This is also called acute otitis media (AOM). Sometimes it is caused by the common cold. This is because congestion can block the internal passage (eustachian tube) that drains fluid from the middle ear. When the middle ear fills with fluid, bacteria can grow there and cause an infection. Oral antibiotics are used to treat this illness, not ear drops. Symptoms usually start to improve within 1 to 2 days of treatment.     Home care  The following are general care guidelines:  Â· Finish all of the antibiotic medicine given, even though you may feel better after the first Scotty.    Impression & Plan      Medical Decision Making:  Jina De La Vega is a 18 year old female who presents to the emergency department today for evaluation of sore throat and vomiting in the setting of infectious mononucleosis.  She does not have any abdominal tenderness.  I do not feel she needs abdominal CT imaging.  Laboratory studies reveal a mild elevation of her transaminases which is not uncommon with mononucleosis.  Her airway is intact.  However, she is unable to keep up with her fluid needs and does appear dehydrated.  She is having severe pain and is unable to manage with oral medications due to her swallowing difficulties and her vomiting.  She was treated with IV fluids, parenteral pain medications and antiemetics.  I also did give a dose of Decadron through the IV.  Given the severity of her symptoms and failure of outpatient management we will admit her to the observation unit for ongoing fluid hydration and pain control.  I spoke with Dr. Fajardo who graciously agreed to admit the patient.    Diagnosis:    ICD-10-CM    1. Infectious mononucleosis with hepatitis B27.99 CBC with platelets differential    B17.8    2. Nausea and vomiting, intractability of vomiting not specified, unspecified vomiting type R11.2    3. Dehydration E86.0    4. Acute pharyngitis due to infectious mononucleosis B27.90      Disposition:   The patient is admitted into the care of Dr. Fajardo.    Scribe Disclosure:  Stepan ARMANDO, am serving as a scribe at 4:56 AM on 7/5/2019 to document services personally performed by Zully Osei MD based on my observations and the provider's statements to me.   EMERGENCY DEPARTMENT       Zully Osei MD  07/05/19 0627     few days. Â· You may use over-the-counter medicine, such as acetaminophen or ibuprofen, to control pain and fever, unless something else was prescribed. If you have chronic liver or kidney disease or have ever had a stomach ulcer or gastrointestinal bleeding, talk with your healthcare provider before using these medicines. Do not give aspirin to anyone under 25years of age who has a fever. It may cause severe illness or death. Follow-up care  Follow up with your healthcare provider, or as advised, in 2 weeks if all symptoms have not gotten better, or if hearing doesn't go back to normal within 1 month. When to seek medical advice  Call your healthcare provider right away if any of these occur:  Â· Ear pain gets worse or does not improve after 3 days of treatment  Â· Unusual drowsiness or confusion  Â· Neck pain, stiff neck, or headache  Â· Fluid or blood draining from the ear canal  Â· Fever of 100.4Â°F (38Â°C) or as Sharyle Rook Seizure  Date Last Reviewed: 6/1/2016  Â© 7394-2725 The [address]. [address]. All rights reserved. This information is not intended as a substitute for professional medical care. Always follow your healthcare professional's instructions.

## 2019-07-05 NOTE — PROGRESS NOTES
IM chart review. 18 year old female admitted AM 7/5 after presenting with throat pain and inability tolerate PO in setting mono diagnosis 5 days ago. Febrile with mild tachycardia and leukocytosis 24 on arrival to obs floor triggering lactate draw which was wnl.    Patient not formally seen by this writer as admitted only 5-6 hours ago and remains clinically stable. Continue plan as outlined in H&P by Dr. Fajardo. Viscous lido PRN swish and swallow ordered.    JoAnna Barthell, PA-C  Pager: [phone] until 6pm.  7/5/2019   1:27 PM

## 2019-07-05 NOTE — PLAN OF CARE
PRIMARY DIAGNOSIS: ACUTE PAIN  OUTPATIENT/OBSERVATION GOALS TO BE MET BEFORE DISCHARGE:  1. Pain Status: improved with narcotics, alternative including ice     2. Return to near baseline physical activity: Yes    3. Cleared for discharge by consultants (if involved): N/A    Discharge Planner Nurse   Safe discharge environment identified: Yes  Barriers to discharge: Yes       Entered by: Casandra Adkins 07/05/2019 10:12 AM     Please review provider order for any additional goals.   Nurse to notify provider when observation goals have been met and patient is ready for discharge.

## 2019-07-05 NOTE — PLAN OF CARE
PRIMARY DIAGNOSIS: ACUTE PAIN  OUTPATIENT/OBSERVATION GOALS TO BE MET BEFORE DISCHARGE:  1. Pain Status: improved with narcotics, alternative including ice     2. Return to near baseline physical activity: Yes    3. Cleared for discharge by consultants (if involved): N/A    Discharge Planner Nurse   Safe discharge environment identified: Yes  Barriers to discharge: Yes       Entered by: Casandra Adkins 07/05/2019 11:54 AM     Please review provider order for any additional goals.   Nurse to notify provider when observation goals have been met and patient is ready for discharge.

## 2019-07-06 LAB
ALBUMIN SERPL-MCNC: 2.8 G/DL (ref 3.4–5)
ALP SERPL-CCNC: 159 U/L (ref 40–150)
ALT SERPL W P-5'-P-CCNC: 232 U/L (ref 0–50)
AST SERPL W P-5'-P-CCNC: 50 U/L (ref 0–35)
BILIRUB DIRECT SERPL-MCNC: 0.2 MG/DL (ref 0–0.2)
BILIRUB SERPL-MCNC: 0.5 MG/DL (ref 0.2–1.3)
PROT SERPL-MCNC: 6.6 G/DL (ref 6.8–8.8)
WBC # BLD AUTO: 13.9 10E9/L (ref 4–11)

## 2019-07-06 PROCEDURE — 25000132 ZZH RX MED GY IP 250 OP 250 PS 637: Performed by: PHYSICIAN ASSISTANT

## 2019-07-06 PROCEDURE — 36415 COLL VENOUS BLD VENIPUNCTURE: CPT | Performed by: PHYSICIAN ASSISTANT

## 2019-07-06 PROCEDURE — 85048 AUTOMATED LEUKOCYTE COUNT: CPT | Performed by: PHYSICIAN ASSISTANT

## 2019-07-06 PROCEDURE — 25000125 ZZHC RX 250: Performed by: PHYSICIAN ASSISTANT

## 2019-07-06 PROCEDURE — 25000131 ZZH RX MED GY IP 250 OP 636 PS 637: Performed by: INTERNAL MEDICINE

## 2019-07-06 PROCEDURE — 80076 HEPATIC FUNCTION PANEL: CPT | Performed by: PHYSICIAN ASSISTANT

## 2019-07-06 PROCEDURE — 25800030 ZZH RX IP 258 OP 636: Performed by: INTERNAL MEDICINE

## 2019-07-06 PROCEDURE — 25000132 ZZH RX MED GY IP 250 OP 250 PS 637: Performed by: INTERNAL MEDICINE

## 2019-07-06 PROCEDURE — 25000128 H RX IP 250 OP 636: Performed by: INTERNAL MEDICINE

## 2019-07-06 PROCEDURE — 96376 TX/PRO/DX INJ SAME DRUG ADON: CPT

## 2019-07-06 PROCEDURE — 99225 ZZC SUBSEQUENT OBSERVATION CARE,LEVEL II: CPT | Performed by: PHYSICIAN ASSISTANT

## 2019-07-06 PROCEDURE — G0378 HOSPITAL OBSERVATION PER HR: HCPCS

## 2019-07-06 RX ORDER — LIDOCAINE HYDROCHLORIDE 20 MG/ML
10 SOLUTION OROPHARYNGEAL
Status: DISCONTINUED | OUTPATIENT
Start: 2019-07-06 | End: 2019-07-09 | Stop reason: HOSPADM

## 2019-07-06 RX ORDER — OXYCODONE HCL 5 MG/5 ML
5-10 SOLUTION, ORAL ORAL
Status: DISCONTINUED | OUTPATIENT
Start: 2019-07-06 | End: 2019-07-09 | Stop reason: HOSPADM

## 2019-07-06 RX ORDER — ACETAMINOPHEN 500 MG
1000 TABLET ORAL 3 TIMES DAILY
Status: DISCONTINUED | OUTPATIENT
Start: 2019-07-06 | End: 2019-07-09 | Stop reason: HOSPADM

## 2019-07-06 RX ADMIN — LIDOCAINE HYDROCHLORIDE 5 ML: 20 SOLUTION ORAL; TOPICAL at 15:54

## 2019-07-06 RX ADMIN — ACETAMINOPHEN 650 MG: 325 TABLET, FILM COATED ORAL at 09:42

## 2019-07-06 RX ADMIN — OXYCODONE HYDROCHLORIDE 10 MG: 5 SOLUTION ORAL at 22:09

## 2019-07-06 RX ADMIN — LIDOCAINE HYDROCHLORIDE 5 ML: 20 SOLUTION ORAL; TOPICAL at 18:05

## 2019-07-06 RX ADMIN — SODIUM CHLORIDE, POTASSIUM CHLORIDE, SODIUM LACTATE AND CALCIUM CHLORIDE: 600; 310; 30; 20 INJECTION, SOLUTION INTRAVENOUS at 02:46

## 2019-07-06 RX ADMIN — ONDANSETRON 4 MG: 2 INJECTION INTRAMUSCULAR; INTRAVENOUS at 18:09

## 2019-07-06 RX ADMIN — SENNOSIDES AND DOCUSATE SODIUM 1 TABLET: 8.6; 5 TABLET ORAL at 19:33

## 2019-07-06 RX ADMIN — ACETAMINOPHEN 650 MG: 325 TABLET, FILM COATED ORAL at 04:17

## 2019-07-06 RX ADMIN — ACETAMINOPHEN 1000 MG: 500 TABLET, FILM COATED ORAL at 19:58

## 2019-07-06 RX ADMIN — DEXAMETHASONE 6 MG: 2 TABLET ORAL at 08:01

## 2019-07-06 RX ADMIN — IBUPROFEN 600 MG: 200 SUSPENSION ORAL at 19:33

## 2019-07-06 RX ADMIN — DEXAMETHASONE 6 MG: 2 TABLET ORAL at 19:33

## 2019-07-06 RX ADMIN — Medication 1 ML: at 22:10

## 2019-07-06 RX ADMIN — SODIUM CHLORIDE, POTASSIUM CHLORIDE, SODIUM LACTATE AND CALCIUM CHLORIDE: 600; 310; 30; 20 INJECTION, SOLUTION INTRAVENOUS at 12:43

## 2019-07-06 RX ADMIN — OXYCODONE HYDROCHLORIDE 10 MG: 5 SOLUTION ORAL at 06:19

## 2019-07-06 RX ADMIN — OXYCODONE HYDROCHLORIDE 10 MG: 5 SOLUTION ORAL at 19:05

## 2019-07-06 RX ADMIN — OXYCODONE HYDROCHLORIDE 10 MG: 5 SOLUTION ORAL at 10:30

## 2019-07-06 RX ADMIN — LIDOCAINE HYDROCHLORIDE 10 ML: 20 SOLUTION ORAL; TOPICAL at 22:47

## 2019-07-06 RX ADMIN — SENNOSIDES AND DOCUSATE SODIUM 1 TABLET: 8.6; 5 TABLET ORAL at 08:01

## 2019-07-06 RX ADMIN — IBUPROFEN 600 MG: 200 SUSPENSION ORAL at 13:22

## 2019-07-06 RX ADMIN — OXYCODONE HYDROCHLORIDE 10 MG: 5 SOLUTION ORAL at 14:52

## 2019-07-06 RX ADMIN — SODIUM CHLORIDE, POTASSIUM CHLORIDE, SODIUM LACTATE AND CALCIUM CHLORIDE: 600; 310; 30; 20 INJECTION, SOLUTION INTRAVENOUS at 22:49

## 2019-07-06 RX ADMIN — IBUPROFEN 600 MG: 200 SUSPENSION ORAL at 07:24

## 2019-07-06 RX ADMIN — ACETAMINOPHEN 650 MG: 325 TABLET, FILM COATED ORAL at 15:47

## 2019-07-06 RX ADMIN — OXYCODONE HYDROCHLORIDE 10 MG: 5 SOLUTION ORAL at 01:58

## 2019-07-06 RX ADMIN — LIDOCAINE HYDROCHLORIDE 10 ML: 20 SOLUTION ORAL; TOPICAL at 20:44

## 2019-07-06 NOTE — PROGRESS NOTES
Observation goals PRIOR TO DISCHARGE    Comments:   1. Treatment of dehydration: Met. IVF infusing.     2. Able to tolerate po : Partially met, still c/o throat pain.     3. Home safety: Yes

## 2019-07-06 NOTE — PROGRESS NOTES
Pt a&o, up indep in room, iv fluids infusing.  Pt c/o constant pain in throat with hurting to swallow food, decreased appetite but taking sips of water and ice chips.  Pt mom at bedside.  Pt prn pain meds given when available, pt has been able to take short sleeps and rests between.  Pt voiding well.  Continue to monitor and POC

## 2019-07-06 NOTE — PROGRESS NOTES
Observation goals-  1. Treatment of dehydration - partially met  2. Able to tolerate po - met  3. Home safety - met

## 2019-07-06 NOTE — PLAN OF CARE
PRIMARY DIAGNOSIS: ACUTE PAIN  OUTPATIENT/OBSERVATION GOALS TO BE MET BEFORE DISCHARGE:  1. Pain Status: able to maintain decreased pain with pain meds and alternative comforts including ice     2. Return to near baseline physical activity: Yes    3. Cleared for discharge by consultants (if involved): N/A    Discharge Planner Nurse   Safe discharge environment identified: Yes  Barriers to discharge: Yes       Entered by: Casandra Adkins 07/06/2019 12:21 PM     Please review provider order for any additional goals.   Nurse to notify provider when observation goals have been met and patient is ready for discharge.

## 2019-07-06 NOTE — PROGRESS NOTES
[facility]    Hospitalist Progress Note    Date of Service (when I saw the patient): 07/06/2019    Assessment & Plan   Jina De La Vega is a 18-year-old with a diagnosis of infectious mononucleosis, now for the last 5 days, admitted a couple days ago with ongoing severe pain and anorexia, is readmitted now due to ongoing recurrent nausea and vomiting, and severe pain making it unable for her to maintain adequate oral hydration.     Infectious mononucleosis.  The patient was getting prednisone and Decadron at home.  The patient presented dehydrated, unable to keep anything down, and had severe pain.    --Patient continues to have severe pain with speaking/swallowing despite pain medications and steroids.  --Fever up to 102 yesterday, afebrile today on APAP/Ibu  --WBC 24.0-->13.9  --IV fluids to continue as patient taking very little oral liquid  --Oxycodone for pain control.    --Ibuprofen and Tylenol as well  --Viscous lido PRN swish and swallow ordered.  --Zofran and Compazine to help with her nausea.   --Continue steroids and plan to taper  --Slow improvement so far; if pain worsens or is not beginning to improve by tomorrow, consider ENT consult or further imaging.    Elevation of transaminases.  The patient's transaminases are elevated as a result of her mononucleosis.    --AST and ALT improving    --Modified diet of full liquids to allow ease of eating.     Deep venous thrombosis prophylaxis:  Compression boots.      CODE STATUS:  Full.    Disposition: Patient to remain hospitalized as she still having severe pain, only able to swallow sips, not able to maintain adequate oral hydration and requiring IV fluids.  Possible discharge in 1-2 days if her symptoms improve and if she is able to take adequate p.o.    Monroe Wiggins    Interval History   Patient reports subjective fevers, continued severe throat pain when speaking or swallowing.  Very fatigued.  Denies shortness of breath,  chest pain, abdominal pain.  Nausea and vomiting has resolved.    -Data reviewed today: I reviewed all new labs and imaging results over the last 24 hours.     Physical Exam   Temp: 97  F (36.1  C) Temp src: Oral BP: 113/73 Pulse: 58 Heart Rate: 59 Resp: 16 SpO2: 100 % O2 Device: None (Room air)    Vitals:    07/05/19 0455   Weight: 57.6 kg (127 lb)     Vital Signs with Ranges  Temp:  [95.7  F (35.4  C)-98.3  F (36.8  C)] 97  F (36.1  C)  Pulse:  [58-97] 58  Heart Rate:  [59] 59  Resp:  [16-20] 16  BP: (111-122)/(64-77) 113/73  SpO2:  [98 %-100 %] 100 %  I/O last 3 completed shifts:  In: 1805 [P.O.:600; I.V.:1205]  Out: 3200 [Urine:3200]    Constitutional: Alert, oriented, well-nourished young female lying in bed, ill-appearing.  Not in distress.  HEENT: Oropharynx is dry  Neck: There is some anterior cervical adenopathy equal bilaterally, tender to palpation, supple  Respiratory:  Lungs CTAB.  No crackles, wheezes, or rhonchi, no labored breathing.  Cardiovascular:  Heart RRR, no MRG, no edema.  GI:  Abdomen soft, NT/ND and with normoactive BS  Skin/Integumen:  Warm, mildly diaphoretic.  MSK: CMS x4 grossly intact.    Medications     lactated ringers 100 mL/hr at 07/06/19 0802       dexamethasone  6 mg Oral Q12H HARI     senna-docusate  1 tablet Oral BID    Or     senna-docusate  2 tablet Oral BID       Data   Recent Labs   Lab 07/06/19  0614 07/05/19  0522   WBC 13.9* 24.0*   HGB  --  13.6   MCV  --  88   PLT  --  224   NA  --  138   POTASSIUM  --  3.9   CHLORIDE  --  103   CO2  --  29   BUN  --  6*   CR  --  0.78   ANIONGAP  --  6   JOHN  --  8.6   GLC  --  109*   ALBUMIN 2.8* 3.3*   PROTTOTAL 6.6* 7.1   BILITOTAL 0.5 0.7   ALKPHOS 159* 187*   * 351*   AST 50* 103*       No results found for this or any previous visit (from the past 24 hour(s)).

## 2019-07-06 NOTE — PROGRESS NOTES
A&O, VSS,Ra. C/O constant throat pain. Pain managed with PRN Tylenol, Ibuprofen and Oxycodone. IND. IV infusing. Labs in the morning. Continue to monitor.    Observation goals-  1. Treatment of dehydration - partially met  2. Able to tolerate po - met  3. Home safety - met

## 2019-07-06 NOTE — PLAN OF CARE
Pain fairly controlled with Oxycodone, Tylenol and Ibuprofen. Tolerating full liquids. Denied nausea. Able to take meds crushed or liquid form. Independent in room. Voiding adequately. LR at 100 into PIV.     Observation goals-  1. Treatment of dehydration - partially met  2. Able to tolerate po - met  3. Home safety - met

## 2019-07-06 NOTE — PLAN OF CARE
PRIMARY DIAGNOSIS: ACUTE PAIN  OUTPATIENT/OBSERVATION GOALS TO BE MET BEFORE DISCHARGE:  1. Pain Status: able to maintain decreased pain with pain meds and alternative comforts including ice     2. Return to near baseline physical activity: Yes    3. Cleared for discharge by consultants (if involved): N/A    Discharge Planner Nurse   Safe discharge environment identified: Yes  Barriers to discharge: Yes       Entered by: Casandra Adkins 07/06/2019 8:07 AM     Please review provider order for any additional goals.   Nurse to notify provider when observation goals have been met and patient is ready for discharge.

## 2019-07-07 ENCOUNTER — APPOINTMENT (OUTPATIENT)
Dept: CT IMAGING | Facility: CLINIC | Age: 19
DRG: 866 | End: 2019-07-07
Attending: PHYSICIAN ASSISTANT
Payer: COMMERCIAL

## 2019-07-07 PROBLEM — J03.80 ACUTE TONSILLITIS DUE TO INFECTIOUS MONONUCLEOSIS: Status: ACTIVE | Noted: 2019-07-07

## 2019-07-07 PROBLEM — B27.90 ACUTE TONSILLITIS DUE TO INFECTIOUS MONONUCLEOSIS: Status: ACTIVE | Noted: 2019-07-07

## 2019-07-07 LAB
ALBUMIN SERPL-MCNC: 2.7 G/DL (ref 3.4–5)
ALP SERPL-CCNC: 136 U/L (ref 40–150)
ALT SERPL W P-5'-P-CCNC: 163 U/L (ref 0–50)
ANION GAP SERPL CALCULATED.3IONS-SCNC: 4 MMOL/L (ref 3–14)
AST SERPL W P-5'-P-CCNC: 29 U/L (ref 0–35)
BILIRUB SERPL-MCNC: 0.3 MG/DL (ref 0.2–1.3)
BUN SERPL-MCNC: 7 MG/DL (ref 7–30)
CALCIUM SERPL-MCNC: 8.7 MG/DL (ref 8.5–10.1)
CHLORIDE SERPL-SCNC: 106 MMOL/L (ref 96–110)
CO2 SERPL-SCNC: 32 MMOL/L (ref 20–32)
CREAT SERPL-MCNC: 0.68 MG/DL (ref 0.5–1)
GFR SERPL CREATININE-BSD FRML MDRD: >90 ML/MIN/{1.73_M2}
GLUCOSE SERPL-MCNC: 131 MG/DL (ref 70–99)
POTASSIUM SERPL-SCNC: 4.5 MMOL/L (ref 3.4–5.3)
PROT SERPL-MCNC: 6.5 G/DL (ref 6.8–8.8)
SODIUM SERPL-SCNC: 142 MMOL/L (ref 133–144)
WBC # BLD AUTO: 10.3 10E9/L (ref 4–11)

## 2019-07-07 PROCEDURE — G0378 HOSPITAL OBSERVATION PER HR: HCPCS

## 2019-07-07 PROCEDURE — 25000125 ZZHC RX 250: Performed by: PHYSICIAN ASSISTANT

## 2019-07-07 PROCEDURE — 25800030 ZZH RX IP 258 OP 636: Performed by: INTERNAL MEDICINE

## 2019-07-07 PROCEDURE — 25000132 ZZH RX MED GY IP 250 OP 250 PS 637: Performed by: PHYSICIAN ASSISTANT

## 2019-07-07 PROCEDURE — 96375 TX/PRO/DX INJ NEW DRUG ADDON: CPT

## 2019-07-07 PROCEDURE — 25000131 ZZH RX MED GY IP 250 OP 636 PS 637: Performed by: INTERNAL MEDICINE

## 2019-07-07 PROCEDURE — 12000000 ZZH R&B MED SURG/OB

## 2019-07-07 PROCEDURE — 25000128 H RX IP 250 OP 636: Performed by: INTERNAL MEDICINE

## 2019-07-07 PROCEDURE — 70491 CT SOFT TISSUE NECK W/DYE: CPT

## 2019-07-07 PROCEDURE — 25000131 ZZH RX MED GY IP 250 OP 636 PS 637: Performed by: PHYSICIAN ASSISTANT

## 2019-07-07 PROCEDURE — 96376 TX/PRO/DX INJ SAME DRUG ADON: CPT

## 2019-07-07 PROCEDURE — 85048 AUTOMATED LEUKOCYTE COUNT: CPT | Performed by: PHYSICIAN ASSISTANT

## 2019-07-07 PROCEDURE — 25800030 ZZH RX IP 258 OP 636: Performed by: PHYSICIAN ASSISTANT

## 2019-07-07 PROCEDURE — 36415 COLL VENOUS BLD VENIPUNCTURE: CPT | Performed by: PHYSICIAN ASSISTANT

## 2019-07-07 PROCEDURE — 25000132 ZZH RX MED GY IP 250 OP 250 PS 637: Performed by: INTERNAL MEDICINE

## 2019-07-07 PROCEDURE — 25000125 ZZHC RX 250: Performed by: INTERNAL MEDICINE

## 2019-07-07 PROCEDURE — 99233 SBSQ HOSP IP/OBS HIGH 50: CPT | Performed by: PHYSICIAN ASSISTANT

## 2019-07-07 PROCEDURE — 80053 COMPREHEN METABOLIC PANEL: CPT | Performed by: PHYSICIAN ASSISTANT

## 2019-07-07 RX ORDER — CLINDAMYCIN PHOSPHATE 900 MG/50ML
900 INJECTION, SOLUTION INTRAVENOUS EVERY 8 HOURS
Status: DISCONTINUED | OUTPATIENT
Start: 2019-07-07 | End: 2019-07-09 | Stop reason: HOSPADM

## 2019-07-07 RX ORDER — OXYCODONE HYDROCHLORIDE 5 MG/1
5-10 TABLET ORAL
Status: COMPLETED | OUTPATIENT
Start: 2019-07-07 | End: 2019-07-07

## 2019-07-07 RX ORDER — IOPAMIDOL 755 MG/ML
80 INJECTION, SOLUTION INTRAVASCULAR ONCE
Status: COMPLETED | OUTPATIENT
Start: 2019-07-07 | End: 2019-07-07

## 2019-07-07 RX ADMIN — SENNOSIDES AND DOCUSATE SODIUM 1 TABLET: 8.6; 5 TABLET ORAL at 19:45

## 2019-07-07 RX ADMIN — SODIUM CHLORIDE, POTASSIUM CHLORIDE, SODIUM LACTATE AND CALCIUM CHLORIDE: 600; 310; 30; 20 INJECTION, SOLUTION INTRAVENOUS at 21:14

## 2019-07-07 RX ADMIN — LIDOCAINE HYDROCHLORIDE 10 ML: 20 SOLUTION ORAL; TOPICAL at 18:56

## 2019-07-07 RX ADMIN — ACETAMINOPHEN 1000 MG: 500 TABLET, FILM COATED ORAL at 07:32

## 2019-07-07 RX ADMIN — OXYCODONE HYDROCHLORIDE 10 MG: 5 SOLUTION ORAL at 16:45

## 2019-07-07 RX ADMIN — DEXAMETHASONE 6 MG: 2 TABLET ORAL at 08:35

## 2019-07-07 RX ADMIN — IBUPROFEN 600 MG: 200 SUSPENSION ORAL at 08:35

## 2019-07-07 RX ADMIN — LIDOCAINE HYDROCHLORIDE 10 ML: 20 SOLUTION ORAL; TOPICAL at 08:38

## 2019-07-07 RX ADMIN — IOPAMIDOL 80 ML: 755 INJECTION, SOLUTION INTRAVENOUS at 10:04

## 2019-07-07 RX ADMIN — IBUPROFEN 600 MG: 200 SUSPENSION ORAL at 01:30

## 2019-07-07 RX ADMIN — OXYCODONE HYDROCHLORIDE 5 MG: 5 SOLUTION ORAL at 04:13

## 2019-07-07 RX ADMIN — OXYCODONE HYDROCHLORIDE 10 MG: 5 TABLET ORAL at 19:45

## 2019-07-07 RX ADMIN — LIDOCAINE HYDROCHLORIDE 10 ML: 20 SOLUTION ORAL; TOPICAL at 16:45

## 2019-07-07 RX ADMIN — ONDANSETRON 4 MG: 2 INJECTION INTRAMUSCULAR; INTRAVENOUS at 07:35

## 2019-07-07 RX ADMIN — OXYCODONE HYDROCHLORIDE 10 MG: 5 SOLUTION ORAL at 10:44

## 2019-07-07 RX ADMIN — OXYCODONE HYDROCHLORIDE 10 MG: 5 SOLUTION ORAL at 01:05

## 2019-07-07 RX ADMIN — SODIUM CHLORIDE, POTASSIUM CHLORIDE, SODIUM LACTATE AND CALCIUM CHLORIDE: 600; 310; 30; 20 INJECTION, SOLUTION INTRAVENOUS at 08:25

## 2019-07-07 RX ADMIN — DEXAMETHASONE 6 MG: 2 TABLET ORAL at 19:45

## 2019-07-07 RX ADMIN — LIDOCAINE HYDROCHLORIDE 10 ML: 20 SOLUTION ORAL; TOPICAL at 06:19

## 2019-07-07 RX ADMIN — LIDOCAINE HYDROCHLORIDE 10 ML: 20 SOLUTION ORAL; TOPICAL at 21:10

## 2019-07-07 RX ADMIN — ACETAMINOPHEN 1000 MG: 500 TABLET, FILM COATED ORAL at 13:47

## 2019-07-07 RX ADMIN — IBUPROFEN 600 MG: 200 SUSPENSION ORAL at 21:10

## 2019-07-07 RX ADMIN — LIDOCAINE HYDROCHLORIDE 10 ML: 20 SOLUTION ORAL; TOPICAL at 10:44

## 2019-07-07 RX ADMIN — LIDOCAINE HYDROCHLORIDE 10 ML: 20 SOLUTION ORAL; TOPICAL at 01:06

## 2019-07-07 RX ADMIN — CLINDAMYCIN PHOSPHATE 900 MG: 900 INJECTION, SOLUTION INTRAVENOUS at 11:20

## 2019-07-07 RX ADMIN — CLINDAMYCIN PHOSPHATE 900 MG: 900 INJECTION, SOLUTION INTRAVENOUS at 17:59

## 2019-07-07 RX ADMIN — OXYCODONE HYDROCHLORIDE 10 MG: 5 SOLUTION ORAL at 07:32

## 2019-07-07 RX ADMIN — LIDOCAINE HYDROCHLORIDE 10 ML: 20 SOLUTION ORAL; TOPICAL at 14:41

## 2019-07-07 RX ADMIN — Medication 1 ML: at 01:08

## 2019-07-07 RX ADMIN — SENNOSIDES AND DOCUSATE SODIUM 1 TABLET: 8.6; 5 TABLET ORAL at 07:33

## 2019-07-07 RX ADMIN — OXYCODONE HYDROCHLORIDE 10 MG: 5 SOLUTION ORAL at 13:47

## 2019-07-07 RX ADMIN — LIDOCAINE HYDROCHLORIDE 10 ML: 20 SOLUTION ORAL; TOPICAL at 12:41

## 2019-07-07 RX ADMIN — IBUPROFEN 600 MG: 200 SUSPENSION ORAL at 15:20

## 2019-07-07 RX ADMIN — SODIUM CHLORIDE 60 ML: 9 INJECTION, SOLUTION INTRAVENOUS at 10:04

## 2019-07-07 RX ADMIN — ACETAMINOPHEN 1000 MG: 500 TABLET, FILM COATED ORAL at 19:45

## 2019-07-07 RX ADMIN — LIDOCAINE HYDROCHLORIDE 10 ML: 20 SOLUTION ORAL; TOPICAL at 04:13

## 2019-07-07 RX ADMIN — OXYCODONE HYDROCHLORIDE 10 MG: 5 SOLUTION ORAL at 22:41

## 2019-07-07 RX ADMIN — Medication 1 ML: at 04:12

## 2019-07-07 ASSESSMENT — ACTIVITIES OF DAILY LIVING (ADL)
ADLS_ACUITY_SCORE: 10
ADLS_ACUITY_SCORE: 10

## 2019-07-07 NOTE — PROGRESS NOTES
Observation goals PRIOR TO DISCHARGE    Comments:   1. Treatment of dehydration: Partially met,IVF infusing.      2. Able to tolerate po : Partially met, still c/o throat pain, MD/PA contacted.      3. Home safety: Yes

## 2019-07-07 NOTE — CONSULTS
Consult Date:  07/07/2019      HISTORY OF PRESENT ILLNESS:  This is a patient who has been admitted for a few days for sore throat, found to have mono.  She has received supportive care, but has failed to improve.  She has been on steroids and pain medication but has not improved and may be worsening right-sided sore throat.  She has had no airway complaints.  She does have significant odynophagia and some referred discomfort towards the right ear.  She is otherwise healthy.  No strong history of tonsillitis.  I saw the patient in the observation area of the [facility].  Examination showed the patient sitting upright, alert and oriented.  No distress.  Normal respirations.  Slight muffled quality to voice.  Neck shows no signs of swelling or redness.  There is tenderness bilaterally, more on the right in the tonsillar no region node region, but no fluctuance or mass palpable.  Just tender lymph nodes.   Mouth and oropharynx show good dentition.  Mild trismus.  The left tonsil is 2+ mildly inflamed without exudate.  The right tonsil has significant exudate and surrounding inflammation of the tonsillar pillars slight fullness in the posterior pillar region, but no evidence of peritonsillar abscess.  Uvula midline.  There also appeared to be some drainage from the nasopharynx.      IMPRESSION:  Patient with mono who I think she may have also a right-sided tonsillitis and suspect for extra tonsillar involvement.  I requested a CT scan soft tissue neck which was performed quite quickly.  The radiologist called me and discussed the films with me.  He said there are some enlarged lymph nodes bilaterally and some soft tissue swelling around the right tonsil, but no abscess formation.      RECOMMENDATION:  I think the patient should remain in house to obtain IV clindamycin.  I do not think she is a surgical candidate at this point.  She has not yet been on antibiotics.  She could continue on the steroids  also.  She received Decadron 6 mg this morning.  I think she can start on a soft diet.  If improved either later today or tomorrow discharged on clindamycin.  If she is not much improved, she should stay again tonight with reconsultation by ENT tomorrow, 2019.         ISABELA XAVIER MD             D: 2019   T: 2019   MT: SHEY      Name:     JEWELL LANDRY   MRN:      [id number]        Account:       [id number]   :      2000           Consult Date:  2019      Document: [id number]

## 2019-07-07 NOTE — PLAN OF CARE
Observation goals PRIOR TO DISCHARGE     Comments:     1. Treatment of dehydration- In progress, L/R cont@100     2. Able to tolerate po -Met, denies any nausea at this time    3. Home safety-Not met        A&OX4.VSS on RA . Achy/sharp throat pain managed with PRN oxy, lidocaine swish and swallow, chloraseptic spray, prn ibuprofen with some effect, Pt states that the pain radiates to joy ears worse on the rt ear.Wants all her PRN pain meds to be given as soon as she can have it , wants to be woken up even if she is sleeping. Up ind, ambulating to bathroom and voiding O.K.IVF L.R cont@100.Plan is possible ENT consult.

## 2019-07-07 NOTE — PROGRESS NOTES
Cross Cover:    Paged regarding pain management.    --Increase lidocaine solution to 10 mL PRN every 2 hours.    --Add PRN chloraseptic spray every hour.    --Schedule Tylenol 1000 mg TID.    --Increase PRN oxycodone 5-10 mg Q 3 hours.      Stiven Meléndez PA-C

## 2019-07-07 NOTE — PLAN OF CARE
OBS GOALS    1. Treatment of dehydration: IN PROGRESS, receiving IVF  2. Able to tolerate po: IN PROGRESS, soft diet ordered after CT complete  3. Home safety: MET

## 2019-07-07 NOTE — PROGRESS NOTES
[facility]    Hospitalist Progress Note    Date of Service (when I saw the patient): 07/07/2019    Assessment & Plan   Jina De La Vega is a 18-year-old with a diagnosis of infectious mononucleosis, now for the last 5 days, seen in the ED on 7/3 with ongoing severe pain and anorexia, is readmitted now due to ongoing recurrent nausea and vomiting, and severe pain making it unable for her to maintain adequate oral hydration.     Infectious mononucleosis with associated tonsillitis.  The patient was getting prednisone and Decadron at home.  The patient presented dehydrated, unable to keep anything down, and had severe pain.    --Pt continued to have severe pain with speaking/swallowing despite pain medications and steroids.  --Fever up to 102 initially, now afebrile on APAP/Ibu  --WBC 24.0-->13.9-->10.3  --Oxycodone for pain control.  Increased frequency to every 3 hours as needed last night due to uncontrolled pain.  Patient's mother is concerned about poor pain control and is advocating for pain medications around-the-clock.   --Tylenol scheduled 1000 mg 3 times daily  --Ibuprofen   --Viscous lidocaine PRN swish and swallow ordered.  --Zofran and Compazine prn for nausea  --Continue steroids   --IV fluids to continue as patient taking very little oral liquid  ------ ENT consulted due to severe throat pain and lack of improvement.  Appreciate ENT - Dr. De La Vega recommendations.  --CT soft tissue neck showing enlarged lymph nodes bilaterally and some soft tissue swelling around the right tonsil, but no abscess formation  --IV clindamycin 900 mg every 8 hours started.  ENT recommends patient stay in hospital for IV antibiotics and can start soft diet.  --Possible discharge tomorrow on oral clindamycin, steroid taper, and pain medication patient improves and is maintaining adequate hydration.    Elevation of transaminases.  The patient's transaminases are elevated as a result of her mononucleosis.     --AST and ALT improving       Deep venous thrombosis prophylaxis:  Compression boots.      CODE STATUS:  Full.    Disposition: Patient to remain hospitalized as she still having significant pain, taking only sips of water, not able to maintain adequate oral hydration and requiring IV fluids and IV antibiotics.  Possible discharge in 1-2 days if her symptoms improve and if she is able to take adequate p.o.    *Discussed the above plans with patient's mother at the bedside, as well as Dr. De La Vega of ENT.    Monroe Wiggins    Interval History   Patient reports severe throat pain last night, 10/10.  Patient states that pain was not well controlled and her oxycodone was increased.  Mother is concerned about the patient's pain control, elects to stay by the bedside to request pain medications on behalf of the patient as soon as they are available.  Discussed rationale for not scheduling opioid pain medications for this issue.  Did discuss other pain control measures as well.  Patient remains very fatigued.  She does state that overall she is improving.  Denies shortness of breath, chest pain, abdominal pain.  Nausea and vomiting has resolved.     -Data reviewed today: I reviewed all new labs and imaging results over the last 24 hours.     Physical Exam   Temp: 96.4  F (35.8  C) Temp src: Oral BP: 130/83   Heart Rate: 70 Resp: 16 SpO2: 99 % O2 Device: None (Room air)    Vitals:    07/05/19 0455   Weight: 57.6 kg (127 lb)     Vital Signs with Ranges  Temp:  [95.7  F (35.4  C)-98.1  F (36.7  C)] 96.4  F (35.8  C)  Heart Rate:  [60-73] 70  Resp:  [15-18] 16  BP: (117-130)/(68-83) 130/83  SpO2:  [95 %-100 %] 99 %  I/O last 3 completed shifts:  In: 240 [P.O.:240]  Out: -     Constitutional: Alert, oriented, well-nourished young female lying in bed, ill-appearing.  Not in distress.  HEENT: Oropharynx is dry.  Left tonsil mildly inflamed without exudate, right tonsil with exudate and surrounding inflammation/swelling.   Uvula midline.  Neck: There is some anterior cervical adenopathy equal bilaterally, tender to palpation, supple.  No erythema.  Respiratory:  Lungs CTAB.  No crackles, wheezes, or rhonchi, no labored breathing.  Cardiovascular:  Heart RRR, no MRG, no edema.  GI:  Abdomen soft, NT/ND and with normoactive BS  Skin/Integumen:  Warm, mildly diaphoretic.  MSK: CMS x4 grossly intact.    Medications     lactated ringers 100 mL/hr at 07/07/19 0825       acetaminophen  1,000 mg Oral TID     clindamycin  900 mg Intravenous Q8H     dexamethasone  6 mg Oral Q12H HARI     senna-docusate  1 tablet Oral BID    Or     senna-docusate  2 tablet Oral BID       Data   Recent Labs   Lab 07/07/19  0655 07/06/19  0614 07/05/19  0522   WBC 10.3 13.9* 24.0*   HGB  --   --  13.6   MCV  --   --  88   PLT  --   --  224     --  138   POTASSIUM 4.5  --  3.9   CHLORIDE 106  --  103   CO2 32  --  29   BUN 7  --  6*   CR 0.68  --  0.78   ANIONGAP 4  --  6   JOHN 8.7  --  8.6   *  --  109*   ALBUMIN 2.7* 2.8* 3.3*   PROTTOTAL 6.5* 6.6* 7.1   BILITOTAL 0.3 0.5 0.7   ALKPHOS 136 159* 187*   * 232* 351*   AST 29 50* 103*       Recent Results (from the past 24 hour(s))   CT Soft Tissue Neck w Contrast    Narrative    CT SCAN OF THE NECK WITH CONTRAST  7/7/2019  10:10 AM     HISTORY: Mononucleosis, concern for tonsillar abscess.    TECHNIQUE: Axial images and coronal reformations. Radiation dose for  this scan was reduced using automated exposure control, adjustment of  the mA and/or kV according to patient size, or iterative  reconstruction technique. 80 mL Isovue-370 IV.     COMPARISON: None.    FINDINGS:   Visualized sinuses, nasopharynx and orbits: Normal.     Tongue, oral cavity and oropharynx: Normal. The palatine tonsils are  not enlarged. There is a small amount of soft tissue edema in the  region of the right palatine tonsil. No discrete abscess is seen.    Hypopharynx: Normal.     Larynx and trachea: Normal.     Thyroid:  Normal.    Submandibular glands: Normal.     Parotid glands: Normal.      Lymph nodes: Multiple enlarged lymph nodes are seen in both the right  and left neck. There is a right-sided level 2A lymph node measuring  1.8 cm in axial dimension. A left-sided level 2A lymph node is also  seen measuring 1.7 cm in axial dimension.     Vasculature: Normal.     Upper mediastinum and lungs: Normal.     Bones: Normal.      Impression    IMPRESSION:   1. Multiple enlarged cervical lymph nodes consistent with the  patient's history of mononucleosis.  2. Mild edema is seen in the region of the right palatine tonsil but  no discrete abscess is identified.    MINDA CHRISTIANSEN MD

## 2019-07-07 NOTE — PLAN OF CARE
Pt to transfer inpatient when bed available. A&Ox4. VSS on RA, afebrile. Mononucleosis, no neck swelling. Achy/sharp throat pain controlled w/ oxy (suspension PO), ibuprofen, tylenol, lidocaine, and heat packs; pt wishes to receive PRN pain meds as soon as available. Zofran x1 for nausea. Mau bites of soft diet and sips of liquids. IVF infusing. IV antibiotic q 8 hrs. WBC: 10.3. ALT: 163. AST: 29. Neck CT complete and resulted. Up ind. Voiding adequately w/o difficulty, last bowel movement 7/5/19. ENT following. Mother at bedside. Continue to monitor.

## 2019-07-07 NOTE — PLAN OF CARE
/OX4. VSS on RA. Easily fatigued. Throat pain managed w/ ra pain  meds and PRN's. Pt states that the pain radiates to R ear, PA paged regarding uncontrolled pain, refer to PA note. Zofran given for nausea w/ relief.Mau full liquid diet well, ate 100% of dinner. IVF infusing as ordered. Pt's mother is at the bedside.

## 2019-07-07 NOTE — PROGRESS NOTES
Report given to ashanti RN. Pt en route to 8th floor. 1800 clindamycin and phenol spray sent w/ pt chart

## 2019-07-07 NOTE — PLAN OF CARE
Observation goals PRIOR TO DISCHARGE     Comments:     1. Treatment of dehydration- In progress, L/R cont@100     2. Able to tolerate po -Met, denies any nausea at this time    3. Home safety-Not met

## 2019-07-07 NOTE — PROVIDER NOTIFICATION
MD Notification    Notified Person: MD    Notified Person Name: Yolette     Notification Date/Time: 1040     Notification Interaction: telephone     Purpose of Notification: Writer was paging about another pt but coincidentally Yolette had just gotten off the phone w/ CT regarding Alanis's results    Orders Received: CT neg for abscess, no surgical intervention at this time. Proceed to soft diet as tolerated. Continue IV clindamycin as ordered    Comments: Yolette would prefer to keep pt overnight as he is concerned about her R tonsil and wants to make sure the antibiotics are helping and pain is controlled

## 2019-07-07 NOTE — PLAN OF CARE
A&Ox4. VSS. Pain managed with oxycodone, ibuprofen, lido swish & swallow, and scheduled tylenol. Tolerating more PO intake. Up independently. Voiding.

## 2019-07-07 NOTE — DISCHARGE INSTRUCTIONS
[facility] [place] [facility] [place]  at [facility]  [address]   ?[phone] [place] [address]   Appointments: [phone] or toll-free [phone]

## 2019-07-08 LAB
ALBUMIN SERPL-MCNC: 2.7 G/DL (ref 3.4–5)
ALP SERPL-CCNC: 125 U/L (ref 40–150)
ALT SERPL W P-5'-P-CCNC: 133 U/L (ref 0–50)
AST SERPL W P-5'-P-CCNC: 34 U/L (ref 0–35)
BILIRUB DIRECT SERPL-MCNC: 0.1 MG/DL (ref 0–0.2)
BILIRUB SERPL-MCNC: 0.3 MG/DL (ref 0.2–1.3)
PROT SERPL-MCNC: 6.4 G/DL (ref 6.8–8.8)
WBC # BLD AUTO: 7.6 10E9/L (ref 4–11)

## 2019-07-08 PROCEDURE — 85048 AUTOMATED LEUKOCYTE COUNT: CPT | Performed by: PHYSICIAN ASSISTANT

## 2019-07-08 PROCEDURE — 25800030 ZZH RX IP 258 OP 636: Performed by: PHYSICIAN ASSISTANT

## 2019-07-08 PROCEDURE — 36415 COLL VENOUS BLD VENIPUNCTURE: CPT | Performed by: PHYSICIAN ASSISTANT

## 2019-07-08 PROCEDURE — 25000131 ZZH RX MED GY IP 250 OP 636 PS 637: Performed by: PHYSICIAN ASSISTANT

## 2019-07-08 PROCEDURE — 80076 HEPATIC FUNCTION PANEL: CPT | Performed by: PHYSICIAN ASSISTANT

## 2019-07-08 PROCEDURE — 25000132 ZZH RX MED GY IP 250 OP 250 PS 637: Performed by: PHYSICIAN ASSISTANT

## 2019-07-08 PROCEDURE — 25000125 ZZHC RX 250: Performed by: PHYSICIAN ASSISTANT

## 2019-07-08 PROCEDURE — 12000000 ZZH R&B MED SURG/OB

## 2019-07-08 PROCEDURE — 99232 SBSQ HOSP IP/OBS MODERATE 35: CPT | Performed by: STUDENT IN AN ORGANIZED HEALTH CARE EDUCATION/TRAINING PROGRAM

## 2019-07-08 RX ADMIN — LIDOCAINE HYDROCHLORIDE 10 ML: 20 SOLUTION ORAL; TOPICAL at 08:32

## 2019-07-08 RX ADMIN — IBUPROFEN 600 MG: 200 SUSPENSION ORAL at 10:29

## 2019-07-08 RX ADMIN — LIDOCAINE HYDROCHLORIDE 10 ML: 20 SOLUTION ORAL; TOPICAL at 10:26

## 2019-07-08 RX ADMIN — ACETAMINOPHEN 650 MG: 325 TABLET, FILM COATED ORAL at 04:11

## 2019-07-08 RX ADMIN — LIDOCAINE HYDROCHLORIDE 10 ML: 20 SOLUTION ORAL; TOPICAL at 01:46

## 2019-07-08 RX ADMIN — ACETAMINOPHEN 1000 MG: 500 TABLET, FILM COATED ORAL at 14:31

## 2019-07-08 RX ADMIN — LIDOCAINE HYDROCHLORIDE 10 ML: 20 SOLUTION ORAL; TOPICAL at 14:31

## 2019-07-08 RX ADMIN — OXYCODONE HYDROCHLORIDE 10 MG: 5 SOLUTION ORAL at 14:32

## 2019-07-08 RX ADMIN — ACETAMINOPHEN 1000 MG: 500 TABLET, FILM COATED ORAL at 08:31

## 2019-07-08 RX ADMIN — LIDOCAINE HYDROCHLORIDE 10 ML: 20 SOLUTION ORAL; TOPICAL at 00:05

## 2019-07-08 RX ADMIN — OXYCODONE HYDROCHLORIDE 10 MG: 5 SOLUTION ORAL at 08:32

## 2019-07-08 RX ADMIN — OXYCODONE HYDROCHLORIDE 10 MG: 5 SOLUTION ORAL at 01:46

## 2019-07-08 RX ADMIN — CLINDAMYCIN PHOSPHATE 900 MG: 900 INJECTION, SOLUTION INTRAVENOUS at 10:25

## 2019-07-08 RX ADMIN — IBUPROFEN 600 MG: 200 SUSPENSION ORAL at 22:24

## 2019-07-08 RX ADMIN — SODIUM CHLORIDE, POTASSIUM CHLORIDE, SODIUM LACTATE AND CALCIUM CHLORIDE: 600; 310; 30; 20 INJECTION, SOLUTION INTRAVENOUS at 08:31

## 2019-07-08 RX ADMIN — OXYCODONE HYDROCHLORIDE 10 MG: 5 SOLUTION ORAL at 20:49

## 2019-07-08 RX ADMIN — ACETAMINOPHEN 1000 MG: 500 TABLET, FILM COATED ORAL at 20:42

## 2019-07-08 RX ADMIN — CLINDAMYCIN PHOSPHATE 900 MG: 900 INJECTION, SOLUTION INTRAVENOUS at 01:46

## 2019-07-08 RX ADMIN — OXYCODONE HYDROCHLORIDE 10 MG: 5 SOLUTION ORAL at 11:32

## 2019-07-08 RX ADMIN — LIDOCAINE HYDROCHLORIDE 10 ML: 20 SOLUTION ORAL; TOPICAL at 04:05

## 2019-07-08 RX ADMIN — ONDANSETRON 4 MG: 4 TABLET, ORALLY DISINTEGRATING ORAL at 17:36

## 2019-07-08 RX ADMIN — LIDOCAINE HYDROCHLORIDE 10 ML: 20 SOLUTION ORAL; TOPICAL at 12:49

## 2019-07-08 RX ADMIN — IBUPROFEN 600 MG: 200 SUSPENSION ORAL at 16:12

## 2019-07-08 RX ADMIN — CLINDAMYCIN PHOSPHATE 900 MG: 900 INJECTION, SOLUTION INTRAVENOUS at 17:59

## 2019-07-08 RX ADMIN — LIDOCAINE HYDROCHLORIDE 10 ML: 20 SOLUTION ORAL; TOPICAL at 20:43

## 2019-07-08 RX ADMIN — SENNOSIDES AND DOCUSATE SODIUM 2 TABLET: 8.6; 5 TABLET ORAL at 10:25

## 2019-07-08 RX ADMIN — OXYCODONE HYDROCHLORIDE 10 MG: 5 SOLUTION ORAL at 05:29

## 2019-07-08 RX ADMIN — IBUPROFEN 600 MG: 200 SUSPENSION ORAL at 04:05

## 2019-07-08 RX ADMIN — DEXAMETHASONE 6 MG: 2 TABLET ORAL at 20:43

## 2019-07-08 RX ADMIN — OXYCODONE HYDROCHLORIDE 10 MG: 5 SOLUTION ORAL at 17:29

## 2019-07-08 RX ADMIN — DEXAMETHASONE 6 MG: 2 TABLET ORAL at 08:31

## 2019-07-08 RX ADMIN — LIDOCAINE HYDROCHLORIDE 10 ML: 20 SOLUTION ORAL; TOPICAL at 06:28

## 2019-07-08 RX ADMIN — ONDANSETRON 4 MG: 4 TABLET, ORALLY DISINTEGRATING ORAL at 08:38

## 2019-07-08 RX ADMIN — SENNOSIDES AND DOCUSATE SODIUM 2 TABLET: 8.6; 5 TABLET ORAL at 20:43

## 2019-07-08 ASSESSMENT — ACTIVITIES OF DAILY LIVING (ADL)
ADLS_ACUITY_SCORE: 10

## 2019-07-08 NOTE — PROGRESS NOTES
"CLINICAL NUTRITION SERVICES  -  ASSESSMENT NOTE      Recommendations Ordered by Registered Dietitian (RD):   Ordered Boost (reta) with meals TID  Ordered Magic Cups (reta) at 2 PM   Malnutrition:   % Weight Loss:  None noted  % Intake:  <75% for > 7 days (non-severe malnutrition)  Subcutaneous Fat Loss:  None observed  Muscle Loss:  None observed  Fluid Retention:  None noted    Malnutrition Diagnosis: Patient does not meet two of the above criteria necessary for diagnosing malnutrition     REASON FOR ASSESSMENT  Jina De La Vega is a 18 year old female seen by Registered Dietitian for Admission Nutrition Risk Screen for reduced oral intake over the last month      NUTRITION HISTORY  - Information obtained from the pt's mother (pt was asleep)  - Patient is on a regular diet at home with no allergies  - Typical food/fluid intake is 3 meals a day (likes meats and veggies)  - Poor intake x 1 week due to mouth pain with swallowing   - Pt had 2 meals yest and ate %   - Pt is a triathlete (frequent exercise)    CURRENT NUTRITION ORDERS  Diet Order:     Mechanical/Dental Soft     Current Intake/Tolerance:  -Poor intake due to swallowing difficulties - pt's mother said she got bread stuck in her throat yesterday and could not swallow  -Some nausea with food, appetite fair  -Ordered cream of potato soup and a blueberry muffin for breakfast this AM but only had bites of the soup    NUTRITION FOCUSED PHYSICAL ASSESSMENT (NFPA)  Completed:  Yes Visual assessment only         Observed:    No nutrition-related physical findings observed    Obtained from Chart/Interdisciplinary Team:  None    ANTHROPOMETRICS  Height: 5' 5\"  Weight: 57.6 kg   Body mass index is 21.13 kg/m .  Weight Status:  Normal BMI  IBW: 56.8 kg   % IBW: 107%  Weight History:   No recent weight hx.   Mother stated UBW is 122-125#.     LABS  Labs reviewed    MEDICATIONS  Medications reviewed  IVF @ 100 ml/hr     ASSESSED NUTRITION NEEDS PER APPROVED " PRACTICE GUIDELINES:    Dosing Weight 57.6 kg (admit wt)  Estimated Energy Needs: 3924-7405 kcals (25-30 Kcal/Kg)  Justification: maintenance  Estimated Protein Needs: 58-70 grams protein (1-1.2 g pro/Kg)  Justification: hypercatabolism with acute illness  Estimated Fluid Needs: 5581-7187 mL (1 mL/Kcal)  Justification: maintenance    MALNUTRITION:  % Weight Loss:  None noted  % Intake:  <75% for > 7 days (non-severe malnutrition)  Subcutaneous Fat Loss:  None observed  Muscle Loss:  None observed  Fluid Retention:  None noted    Malnutrition Diagnosis: Patient does not meet two of the above criteria necessary for diagnosing malnutrition    NUTRITION DIAGNOSIS:  Inadequate oral intake related to swallowing dysfunction and nausea with food as evidenced by <75% intake for the past week.       NUTRITION INTERVENTIONS  Recommendations / Nutrition Prescription  Ordered Boost (reta) with meals TID  Ordered Magic Cups (reta) at 2 PM     Implementation  Nutrition education: Provided education on nutritional supplements  Medical Food Supplement    Nutrition Goals  Pt will consume % of meals and supplements TID.     MONITORING AND EVALUATION:  Progress towards goals will be monitored and evaluated per protocol and Practice Guidelines    Thais Williams  Nutrition Services

## 2019-07-08 NOTE — PLAN OF CARE
C/o throat pain 8/10. Using PRN's often with minimal relief. Tolerating small PO intake, continue IVF. Up in room ind. Plan for discharge tomorrow.

## 2019-07-08 NOTE — PROGRESS NOTES
Right-side throat pain improving. Airway intact. Gave some PRNs per pt's request. Tolerating diet fairly well. Hoping to discharge home in the morning.

## 2019-07-08 NOTE — PLAN OF CARE
A&Ox4. VSS on RA. C/o throat pain, oxycodone, ibuprofen, lido swish, and tylenol with some relief. PIV infusing LR at 100 ml/hr. Up independently in the room. Mother at bedside. Will continue to monitor.

## 2019-07-08 NOTE — PROGRESS NOTES
[facility]    Hospitalist Progress Note    Date of Service (when I saw the patient): 07/08/2019    Assessment & Plan   Jina De La Vega is a 18-year-old with a diagnosis of infectious mononucleosis, now for the last 5 days, seen in the ED on 7/3 with ongoing severe pain and anorexia, is readmitted now due to ongoing recurrent nausea and vomiting, and severe pain making it unable for her to maintain adequate oral hydration.     Infectious mononucleosis with associated tonsillitis.  The patient was getting prednisone and Decadron at home.  The patient presented dehydrated, unable to keep anything down, and had severe pain.    --Pt continued to have severe pain with speaking/swallowing despite pain medications and steroids.  --Fever up to 102 initially, now afebrile on APAP/Ibu  --WBC 24.0-->13.9-->10.3  --Oxycodone for pain control.  Increased frequency to every 3 hours as needed last night due to uncontrolled pain.  Patient's mother is concerned about poor pain control and is advocating for pain medications around-the-clock.   --Tylenol scheduled 1000 mg 3 times daily  --Ibuprofen   --Viscous lidocaine PRN swish and swallow ordered.  --Zofran and Compazine prn for nausea  --Continue steroids   --IV fluids to continue as patient taking very little oral liquid  ------ ENT consulted due to severe throat pain and lack of improvement.  Appreciate ENT - Dr. De La Vega recommendations.  --CT soft tissue neck showing enlarged lymph nodes bilaterally and some soft tissue swelling around the right tonsil, but no abscess formation  Plan:  --IV clindamycin 900 mg every 8 hours started.  ENT recommends patient stay in hospital for IV antibiotics and can start soft diet.  --Possible discharge tomorrow on oral clindamycin, steroid taper, and pain medication patient improves and is able to maintain adequate hydration.    Elevation of transaminases.  The patient's transaminases are elevated as a result of her  mononucleosis.    --AST and ALT improving       Deep venous thrombosis prophylaxis:  Compression boots.      CODE STATUS:  Full.    Disposition: Patient to remain hospitalized as she still having significant pain, taking only sips of water, not able to maintain adequate oral hydration and requiring IV fluids and IV antibiotics.  Possible discharge tomorrow if her symptoms improve and if she is able to take adequate p.o.      Reagan Ortiz    Interval History      Patient reports throat pain 6-7/10 this AM.  Patient states that pain controlled better today.  Patient remains fatigued,but slightly better today.  She does state that overall she is continuing to improve.  Denies shortness of breath, chest pain, abdominal pain.  Nausea and vomiting not endorsed by patient. No fevers. Does not feel safe for discharge today.     -Data reviewed today: I reviewed all new labs and imaging results over the last 24 hours.     Physical Exam   Temp: 97.2  F (36.2  C) Temp src: Oral BP: 118/73 Pulse: 56 Heart Rate: 64 Resp: 16 SpO2: 96 % O2 Device: None (Room air)    Vitals:    07/05/19 0455   Weight: 57.6 kg (127 lb)     Vital Signs with Ranges  Temp:  [96.1  F (35.6  C)-97.5  F (36.4  C)] 97.2  F (36.2  C)  Pulse:  [56] 56  Heart Rate:  [64-78] 64  Resp:  [16] 16  BP: (118-125)/(65-76) 118/73  SpO2:  [96 %-98 %] 96 %  I/O last 3 completed shifts:  In: 480 [P.O.:480]  Out: -     Constitutional: no apparent distress  HEENT: Oropharynx is dry.  Left tonsil mildly inflamed without exudate, right tonsil with mild exudate and surrounding inflammation/swelling.  Uvula midline.  Neck: There is mild anterior cervical adenopathy equal bilaterally, non-tender today. No erythema.  Respiratory: CTABL  Cardiovascular:  Heart RRR, no MRG, no edema.  GI:  Abdomen soft, NT/ND and with normoactive BS  Skin/Integumen:  Warm, mildly diaphoretic.  MSK: CMS x4 grossly intact.    Medications     lactated ringers 100 mL/hr at 07/08/19 0831        acetaminophen  1,000 mg Oral TID     clindamycin  900 mg Intravenous Q8H     dexamethasone  6 mg Oral Q12H [facility]     senna-docusate  1 tablet Oral BID    Or     senna-docusate  2 tablet Oral BID       Data   Recent Labs   Lab 07/08/19  0752 07/07/19  0655 07/06/19  0614 07/05/19  0522   WBC 7.6 10.3 13.9* 24.0*   HGB  --   --   --  13.6   MCV  --   --   --  88   PLT  --   --   --  224   NA  --  142  --  138   POTASSIUM  --  4.5  --  3.9   CHLORIDE  --  106  --  103   CO2  --  32  --  29   BUN  --  7  --  6*   CR  --  0.68  --  0.78   ANIONGAP  --  4  --  6   JOHN  --  8.7  --  8.6   GLC  --  131*  --  109*   ALBUMIN 2.7* 2.7* 2.8* 3.3*   PROTTOTAL 6.4* 6.5* 6.6* 7.1   BILITOTAL 0.3 0.3 0.5 0.7   ALKPHOS 125 136 159* 187*   * 163* 232* 351*   AST 34 29 50* 103*       No results found for this or any previous visit (from the past 24 hour(s)).

## 2019-07-09 VITALS
TEMPERATURE: 96.3 F | WEIGHT: 127 LBS | BODY MASS INDEX: 21.16 KG/M2 | HEIGHT: 65 IN | RESPIRATION RATE: 16 BRPM | OXYGEN SATURATION: 97 % | DIASTOLIC BLOOD PRESSURE: 70 MMHG | SYSTOLIC BLOOD PRESSURE: 121 MMHG | HEART RATE: 56 BPM

## 2019-07-09 PROCEDURE — 25000132 ZZH RX MED GY IP 250 OP 250 PS 637: Performed by: PHYSICIAN ASSISTANT

## 2019-07-09 PROCEDURE — 25000128 H RX IP 250 OP 636: Performed by: PHYSICIAN ASSISTANT

## 2019-07-09 PROCEDURE — 25000131 ZZH RX MED GY IP 250 OP 636 PS 637: Performed by: PHYSICIAN ASSISTANT

## 2019-07-09 PROCEDURE — 25800030 ZZH RX IP 258 OP 636: Performed by: PHYSICIAN ASSISTANT

## 2019-07-09 PROCEDURE — 25000125 ZZHC RX 250: Performed by: PHYSICIAN ASSISTANT

## 2019-07-09 PROCEDURE — 99239 HOSP IP/OBS DSCHRG MGMT >30: CPT | Performed by: STUDENT IN AN ORGANIZED HEALTH CARE EDUCATION/TRAINING PROGRAM

## 2019-07-09 RX ORDER — IBUPROFEN 100 MG/5ML
300 SUSPENSION, ORAL (FINAL DOSE FORM) ORAL EVERY 6 HOURS PRN
Qty: 150 ML | Refills: 0 | Status: SHIPPED | OUTPATIENT
Start: 2019-07-09

## 2019-07-09 RX ORDER — DEXAMETHASONE 2 MG/1
TABLET ORAL
Qty: 18 TABLET | Refills: 0 | Status: SHIPPED | OUTPATIENT
Start: 2019-07-09 | End: 2019-07-18

## 2019-07-09 RX ORDER — ACETAMINOPHEN 325 MG/1
650 TABLET ORAL EVERY 4 HOURS PRN
Qty: 30 TABLET | Refills: 0 | Status: SHIPPED | OUTPATIENT
Start: 2019-07-09

## 2019-07-09 RX ORDER — ACETAMINOPHEN 325 MG/1
650 TABLET ORAL EVERY 4 HOURS PRN
Qty: 30 TABLET | Refills: 0 | Status: SHIPPED | OUTPATIENT
Start: 2019-07-09 | End: 2019-07-09

## 2019-07-09 RX ORDER — OXYCODONE HCL 5 MG/5 ML
5 SOLUTION, ORAL ORAL EVERY 4 HOURS PRN
Qty: 75 ML | Refills: 0 | Status: SHIPPED | OUTPATIENT
Start: 2019-07-09

## 2019-07-09 RX ORDER — IBUPROFEN 100 MG/5ML
300 SUSPENSION, ORAL (FINAL DOSE FORM) ORAL EVERY 6 HOURS PRN
Qty: 150 ML | Refills: 0 | Status: SHIPPED | OUTPATIENT
Start: 2019-07-09 | End: 2019-07-09

## 2019-07-09 RX ADMIN — CLINDAMYCIN PHOSPHATE 900 MG: 900 INJECTION, SOLUTION INTRAVENOUS at 09:53

## 2019-07-09 RX ADMIN — DEXAMETHASONE 6 MG: 2 TABLET ORAL at 08:06

## 2019-07-09 RX ADMIN — LIDOCAINE HYDROCHLORIDE 10 ML: 20 SOLUTION ORAL; TOPICAL at 04:33

## 2019-07-09 RX ADMIN — OXYCODONE HYDROCHLORIDE 10 MG: 5 SOLUTION ORAL at 06:24

## 2019-07-09 RX ADMIN — SODIUM CHLORIDE, POTASSIUM CHLORIDE, SODIUM LACTATE AND CALCIUM CHLORIDE: 600; 310; 30; 20 INJECTION, SOLUTION INTRAVENOUS at 08:11

## 2019-07-09 RX ADMIN — SENNOSIDES AND DOCUSATE SODIUM 1 TABLET: 8.6; 5 TABLET ORAL at 09:53

## 2019-07-09 RX ADMIN — CLINDAMYCIN PHOSPHATE 900 MG: 900 INJECTION, SOLUTION INTRAVENOUS at 02:30

## 2019-07-09 RX ADMIN — ACETAMINOPHEN 1000 MG: 500 TABLET, FILM COATED ORAL at 12:50

## 2019-07-09 RX ADMIN — IBUPROFEN 600 MG: 200 SUSPENSION ORAL at 04:33

## 2019-07-09 RX ADMIN — ACETAMINOPHEN 1000 MG: 500 TABLET, FILM COATED ORAL at 08:06

## 2019-07-09 RX ADMIN — LIDOCAINE HYDROCHLORIDE 10 ML: 20 SOLUTION ORAL; TOPICAL at 06:24

## 2019-07-09 RX ADMIN — OXYCODONE HYDROCHLORIDE 10 MG: 5 SOLUTION ORAL at 00:02

## 2019-07-09 RX ADMIN — OXYCODONE HYDROCHLORIDE 10 MG: 5 SOLUTION ORAL at 03:01

## 2019-07-09 RX ADMIN — IBUPROFEN 600 MG: 200 SUSPENSION ORAL at 09:52

## 2019-07-09 RX ADMIN — ONDANSETRON 4 MG: 2 INJECTION INTRAMUSCULAR; INTRAVENOUS at 08:06

## 2019-07-09 RX ADMIN — OXYCODONE HYDROCHLORIDE 10 MG: 5 SOLUTION ORAL at 09:52

## 2019-07-09 RX ADMIN — LIDOCAINE HYDROCHLORIDE 10 ML: 20 SOLUTION ORAL; TOPICAL at 00:06

## 2019-07-09 RX ADMIN — LIDOCAINE HYDROCHLORIDE 10 ML: 20 SOLUTION ORAL; TOPICAL at 02:30

## 2019-07-09 RX ADMIN — OXYCODONE HYDROCHLORIDE 10 MG: 5 SOLUTION ORAL at 12:51

## 2019-07-09 ASSESSMENT — ACTIVITIES OF DAILY LIVING (ADL)
ADLS_ACUITY_SCORE: 10

## 2019-07-09 ASSESSMENT — ENCOUNTER SYMPTOMS
COUGH: 0
SHORTNESS OF BREATH: 0
ABDOMINAL PAIN: 0
FEVER: 0

## 2019-07-09 NOTE — PLAN OF CARE
[id number] Patient A&Ox4, VSS on RA. Up independent. Iv infusing . On mechanical soft diet. Pain rated 7/10 Oxycodone , Lidocaine & ice given . Plan discharge home tomorrow. Will continue monitoring.

## 2019-07-09 NOTE — PLAN OF CARE
A&O. VSS on RA. Up independently in room. Pain managed with oxycodone, tylenol and ibuprofen. LS clear. Tolerating mech soft diet. IV removed. Discharge to home today with family.

## 2019-07-09 NOTE — PLAN OF CARE
A&O. VSS. Pain 7-8/10; oxycodone x3, ibuprofen x1, lidocaine swish x3, and ice given. LR @ 100. Up IND, mech soft diet as tolerated. Mom at bedside. discharge probable 7/9 pending adequate PO intake and pain control.

## 2019-07-09 NOTE — DISCHARGE SUMMARY
[facility]  Hospitalist Discharge Summary       Date of Admission:  7/5/2019  Date of Discharge:  7/9/2019  Discharging Provider: Reagan Ortiz MD      Discharge Diagnoses     Infectious mononucleosis   Tonsillitis.     Follow-ups Needed After Discharge   Follow-up Appointments     Follow-up and recommended labs and tests       Follow up with primary care provider, Oralia Mcintosh, within   2-3 days for hospital follow- up.  The following labs/tests are   recommended: None.           Unresulted Labs Ordered in the Past 30 Days of this Admission     No orders found from 5/6/2019 to 7/6/2019.        Discharge Disposition   Discharged to home  Condition at discharge: Stable    Hospital Course      Jina De La Vega is a 18-year-old with a diagnosis of infectious mononucleosis, now for the last 5 days, seen in the ED on 7/3 with ongoing severe pain and anorexia, is readmitted now due to ongoing recurrent nausea and vomiting, and severe pain making it unable for her to maintain adequate oral hydration.     Infectious mononucleosis with associated tonsillitis.  The patient was getting prednisone and Decadron at home.  The patient presented dehydrated, unable to keep anything down, and had severe pain.    --Fever up to 102 initially  - ENT consulted due to severe throat pain and lack of improvement.  Appreciate ENT - Dr. De La Vega recommendations.  --CT soft tissue neck showing enlarged lymph nodes bilaterally and some soft tissue swelling around the right tonsil, but no abscess formation  Plan:  - Augmentin BID for total of 14 days at discharge  - Decadron taper    Elevation of transaminases.  The patient's transaminases are elevated as a result of her mononucleosis.    --AST and ALT improving       Consultations This Hospital Stay   ENT IP CONSULT    Code Status   No Order    Time Spent on this Encounter   I, Reagan Ortiz, personally saw the patient today and spent greater than 30 minutes discharging  this patient.       Reagan Ortiz MD  [facility]  ______________________________________________________________________    Physical Exam   Vital Signs: Temp: 98  F (36.7  C) Temp src: Oral BP: 116/84   Heart Rate: 84 Resp: 18 SpO2: 96 % O2 Device: None (Room air)    Weight: 127 lbs 0 oz       Primary Care Physician   Oralia Mcintosh    Discharge Orders      Reason for your hospital stay    You had sore throat, difficulty swallowing and tosillar pain needing antibiotics.     Follow-up and recommended labs and tests     Follow up with primary care provider, Oralia Mcintosh, within 2-3 days for hospital follow- up.  The following labs/tests are recommended: None.     Activity    Your activity upon discharge: activity as tolerated     Diet    Follow this diet upon discharge: Orders Placed This Encounter      Snacks/Supplements Adult: Boost Plus; With Meals      Snacks/Supplements Adult: Magic Cup; Between Meals      Mechanical/Dental Soft Diet       Significant Results and Procedures   Most Recent 3 CBC's:  Recent Labs   Lab Test 07/08/19  0752 07/07/19  0655 07/06/19  0614 07/05/19  0522   WBC 7.6 10.3 13.9* 24.0*   HGB  --   --   --  13.6   MCV  --   --   --  88   PLT  --   --   --  224     Most Recent 3 BMP's:  Recent Labs   Lab Test 07/07/19  0655 07/05/19  0522    138   POTASSIUM 4.5 3.9   CHLORIDE 106 103   CO2 32 29   BUN 7 6*   CR 0.68 0.78   ANIONGAP 4 6   JOHN 8.7 8.6   * 109*   ,   Results for orders placed or performed during the hospital encounter of 07/05/19   CT Soft Tissue Neck w Contrast    Narrative    CT SCAN OF THE NECK WITH CONTRAST  7/7/2019  10:10 AM     HISTORY: Mononucleosis, concern for tonsillar abscess.    TECHNIQUE: Axial images and coronal reformations. Radiation dose for  this scan was reduced using automated exposure control, adjustment of  the mA and/or kV according to patient size, or iterative  reconstruction technique. 80 mL Isovue-370 IV.      COMPARISON: None.    FINDINGS:   Visualized sinuses, nasopharynx and orbits: Normal.     Tongue, oral cavity and oropharynx: Normal. The palatine tonsils are  not enlarged. There is a small amount of soft tissue edema in the  region of the right palatine tonsil. No discrete abscess is seen.    Hypopharynx: Normal.     Larynx and trachea: Normal.     Thyroid: Normal.    Submandibular glands: Normal.     Parotid glands: Normal.      Lymph nodes: Multiple enlarged lymph nodes are seen in both the right  and left neck. There is a right-sided level 2A lymph node measuring  1.8 cm in axial dimension. A left-sided level 2A lymph node is also  seen measuring 1.7 cm in axial dimension.     Vasculature: Normal.     Upper mediastinum and lungs: Normal.     Bones: Normal.      Impression    IMPRESSION:   1. Multiple enlarged cervical lymph nodes consistent with the  patient's history of mononucleosis.  2. Mild edema is seen in the region of the right palatine tonsil but  no discrete abscess is identified.    MINDA CHRISTIANSEN MD       Discharge Medications   Current Discharge Medication List      START taking these medications    Details   acetaminophen (TYLENOL) 325 MG tablet Take 2 tablets (650 mg) by mouth every 4 hours as needed for mild pain  Qty: 30 tablet, Refills: 0    Associated Diagnoses: Acute tonsillitis due to infectious mononucleosis      amoxicillin-clavulanate (AUGMENTIN) 875-125 MG tablet Take 1 tablet by mouth 2 times daily for 11 days  Qty: 22 tablet, Refills: 0    Associated Diagnoses: Acute tonsillitis due to infectious mononucleosis      ibuprofen (ADVIL/MOTRIN) 100 MG/5ML suspension Take 15 mLs (300 mg) by mouth every 6 hours as needed for other (mild pain)  Qty: 150 mL, Refills: 0    Associated Diagnoses: Acute tonsillitis due to infectious mononucleosis      oxyCODONE (ROXICODONE) 5 MG/5ML solution Take 5 mLs (5 mg) by mouth every 4 hours as needed for moderate to severe pain  Qty: 75 mL, Refills: 0     Associated Diagnoses: Acute tonsillitis due to infectious mononucleosis         CONTINUE these medications which have CHANGED    Details   dexamethasone (DECADRON) 2 MG tablet Take 3 tablets (6 mg) by mouth daily (with breakfast) for 3 days, THEN 2 tablets (4 mg) daily (with breakfast) for 3 days, THEN 1 tablet (2 mg) daily (with breakfast) for 3 days.  Qty: 18 tablet, Refills: 0    Associated Diagnoses: Acute tonsillitis due to infectious mononucleosis         CONTINUE these medications which have NOT CHANGED    Details   escitalopram (LEXAPRO) 10 MG tablet Take 10 mg by mouth daily      ondansetron (ZOFRAN-ODT) 4 MG ODT tab Take 4 mg by mouth every 6 hours as needed for nausea      spironolactone (ALDACTONE) 50 MG tablet Take 50 mg by mouth daily         STOP taking these medications       predniSONE (DELTASONE) 20 MG tablet Comments:   Reason for Stopping:             Allergies   No Known Allergies

## 2019-07-09 NOTE — PLAN OF CARE
[id number]: A&Ox4. VSS on RA. Discharging home. Transportation home provided by her mother. Discharge instructions discussed with patient. Discharge meds discussed and given to patient. Personal belongings with patient.

## 2020-07-28 ENCOUNTER — HOSPITAL ENCOUNTER (OUTPATIENT)
Dept: BEHAVIORAL HEALTH | Facility: CLINIC | Age: 20
Discharge: HOME | End: 2020-07-28
Attending: FAMILY MEDICINE | Admitting: FAMILY MEDICINE
Payer: COMMERCIAL

## 2020-07-28 PROCEDURE — 90791 PSYCH DIAGNOSTIC EVALUATION: CPT | Mod: GT | Performed by: PSYCHOLOGIST

## 2020-07-28 ASSESSMENT — COLUMBIA-SUICIDE SEVERITY RATING SCALE - C-SSRS
TOTAL  NUMBER OF ABORTED OR SELF INTERRUPTED ATTEMPTS PAST LIFETIME: NO
REASONS FOR IDEATION PAST MONTH: MOSTLY TO END OR STOP THE PAIN (YOU COULDN'T GO ON LIVING WITH THE PAIN OR HOW YOU WERE FEELING)
2. HAVE YOU ACTUALLY HAD ANY THOUGHTS OF KILLING YOURSELF?: YES
TOTAL  NUMBER OF INTERRUPTED ATTEMPTS PAST 3 MONTHS: NO
TOTAL  NUMBER OF ABORTED OR SELF INTERRUPTED ATTEMPTS PAST 3 MONTHS: NO
5. HAVE YOU STARTED TO WORK OUT OR WORKED OUT THE DETAILS OF HOW TO KILL YOURSELF? DO YOU INTEND TO CARRY OUT THIS PLAN?: NO
2. HAVE YOU ACTUALLY HAD ANY THOUGHTS OF KILLING YOURSELF LIFETIME?: NO
3. HAVE YOU BEEN THINKING ABOUT HOW YOU MIGHT KILL YOURSELF?: NO
1. IN THE PAST MONTH, HAVE YOU WISHED YOU WERE DEAD OR WISHED YOU COULD GO TO SLEEP AND NOT WAKE UP?: NO
ATTEMPT PAST THREE MONTHS: NO
4. HAVE YOU HAD THESE THOUGHTS AND HAD SOME INTENTION OF ACTING ON THEM?: NO
6. HAVE YOU EVER DONE ANYTHING, STARTED TO DO ANYTHING, OR PREPARED TO DO ANYTHING TO END YOUR LIFE?: NO
6. HAVE YOU EVER DONE ANYTHING, STARTED TO DO ANYTHING, OR PREPARED TO DO ANYTHING TO END YOUR LIFE?: NO
1. IN THE PAST MONTH, HAVE YOU WISHED YOU WERE DEAD OR WISHED YOU COULD GO TO SLEEP AND NOT WAKE UP?: YES
4. HAVE YOU HAD THESE THOUGHTS AND HAD SOME INTENTION OF ACTING ON THEM?: NO
ATTEMPT LIFETIME: NO
1. IN THE PAST MONTH, HAVE YOU WISHED YOU WERE DEAD OR WISHED YOU COULD GO TO SLEEP AND NOT WAKE UP?: THOUGHTS OF NOT WANTING TO BE ALIVE
TOTAL  NUMBER OF INTERRUPTED ATTEMPTS LIFETIME: NO
5. HAVE YOU STARTED TO WORK OUT OR WORKED OUT THE DETAILS OF HOW TO KILL YOURSELF? DO YOU INTEND TO CARRY OUT THIS PLAN?: NO
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
## 2020-12-14 ENCOUNTER — HEALTH MAINTENANCE LETTER (OUTPATIENT)
Age: 20
End: 2020-12-14

## 2021-02-27 ENCOUNTER — HEALTH MAINTENANCE LETTER (OUTPATIENT)
Age: 21
End: 2021-02-27

## 2021-10-02 ENCOUNTER — HEALTH MAINTENANCE LETTER (OUTPATIENT)
Age: 21
End: 2021-10-02

## 2022-01-22 ENCOUNTER — HEALTH MAINTENANCE LETTER (OUTPATIENT)
Age: 22
End: 2022-01-22

## 2022-09-03 ENCOUNTER — HEALTH MAINTENANCE LETTER (OUTPATIENT)
Age: 22
End: 2022-09-03

## 2023-04-23 ENCOUNTER — HEALTH MAINTENANCE LETTER (OUTPATIENT)
Age: 23
End: 2023-04-23